# Patient Record
Sex: FEMALE | Race: WHITE | NOT HISPANIC OR LATINO | Employment: UNEMPLOYED | URBAN - METROPOLITAN AREA
[De-identification: names, ages, dates, MRNs, and addresses within clinical notes are randomized per-mention and may not be internally consistent; named-entity substitution may affect disease eponyms.]

---

## 2023-04-04 ENCOUNTER — OFFICE VISIT (OUTPATIENT)
Dept: PRIMARY CARE | Facility: CLINIC | Age: 38
End: 2023-04-04
Payer: COMMERCIAL

## 2023-04-04 VITALS
HEART RATE: 70 BPM | DIASTOLIC BLOOD PRESSURE: 70 MMHG | BODY MASS INDEX: 29.56 KG/M2 | WEIGHT: 206 LBS | SYSTOLIC BLOOD PRESSURE: 130 MMHG

## 2023-04-04 DIAGNOSIS — K21.9 GASTROESOPHAGEAL REFLUX DISEASE WITHOUT ESOPHAGITIS: ICD-10-CM

## 2023-04-04 DIAGNOSIS — F41.9 ANXIETY: Primary | ICD-10-CM

## 2023-04-04 DIAGNOSIS — E66.3 OVERWEIGHT (BMI 25.0-29.9): ICD-10-CM

## 2023-04-04 PROBLEM — J45.990 ASTHMA, EXERCISE INDUCED (HHS-HCC): Status: ACTIVE | Noted: 2023-04-04

## 2023-04-04 PROBLEM — M25.561 RIGHT KNEE PAIN: Status: ACTIVE | Noted: 2023-04-04

## 2023-04-04 PROBLEM — F51.04 CHRONIC INSOMNIA: Status: ACTIVE | Noted: 2023-04-04

## 2023-04-04 PROBLEM — G62.9 PERIPHERAL NEUROPATHY: Status: ACTIVE | Noted: 2022-03-29

## 2023-04-04 PROBLEM — M54.50 CHRONIC LOW BACK PAIN: Status: ACTIVE | Noted: 2023-04-04

## 2023-04-04 PROBLEM — E65 ABDOMINAL PANNUS: Status: ACTIVE | Noted: 2023-04-04

## 2023-04-04 PROBLEM — E04.9 GOITER: Status: ACTIVE | Noted: 2023-04-04

## 2023-04-04 PROBLEM — F39 UNSPECIFIED MOOD (AFFECTIVE) DISORDER (CMS-HCC): Chronic | Status: ACTIVE | Noted: 2022-05-11

## 2023-04-04 PROBLEM — F60.3 BORDERLINE PERSONALITY DISORDER (MULTI): Chronic | Status: ACTIVE | Noted: 2021-06-02

## 2023-04-04 PROBLEM — G89.29 CHRONIC LOW BACK PAIN: Status: ACTIVE | Noted: 2023-04-04

## 2023-04-04 PROBLEM — F43.10 POST-TRAUMATIC STRESS DISORDER, UNSPECIFIED: Status: ACTIVE | Noted: 2021-06-02

## 2023-04-04 PROBLEM — E55.9 VITAMIN D DEFICIENCY: Status: ACTIVE | Noted: 2023-04-04

## 2023-04-04 PROCEDURE — 99213 OFFICE O/P EST LOW 20 MIN: CPT | Performed by: STUDENT IN AN ORGANIZED HEALTH CARE EDUCATION/TRAINING PROGRAM

## 2023-04-04 PROCEDURE — 1036F TOBACCO NON-USER: CPT | Performed by: STUDENT IN AN ORGANIZED HEALTH CARE EDUCATION/TRAINING PROGRAM

## 2023-04-04 RX ORDER — MOMETASONE FUROATE AND FORMOTEROL FUMARATE DIHYDRATE 200; 5 UG/1; UG/1
AEROSOL RESPIRATORY (INHALATION)
COMMUNITY
Start: 2021-05-19

## 2023-04-04 RX ORDER — PROPRANOLOL HYDROCHLORIDE 10 MG/1
10 TABLET ORAL DAILY PRN
COMMUNITY
Start: 2023-04-03

## 2023-04-04 RX ORDER — QUETIAPINE FUMARATE 25 MG/1
25 TABLET, FILM COATED ORAL 2 TIMES DAILY
COMMUNITY
Start: 2022-04-29

## 2023-04-04 RX ORDER — ACETAMINOPHEN 500 MG
50 TABLET ORAL DAILY
COMMUNITY

## 2023-04-04 RX ORDER — IBUPROFEN 600 MG/1
1 TABLET ORAL EVERY 6 HOURS PRN
COMMUNITY
Start: 2019-04-09

## 2023-04-04 RX ORDER — HYDROXYZINE HYDROCHLORIDE 50 MG/1
50 TABLET, FILM COATED ORAL
COMMUNITY
Start: 2023-04-03 | End: 2024-01-26 | Stop reason: SDUPTHER

## 2023-04-04 RX ORDER — OMEPRAZOLE 40 MG/1
40 CAPSULE, DELAYED RELEASE ORAL
COMMUNITY
End: 2023-07-28 | Stop reason: SDUPTHER

## 2023-04-04 RX ORDER — TOPIRAMATE 50 MG/1
50 TABLET, FILM COATED ORAL 2 TIMES DAILY
COMMUNITY
Start: 2023-04-03 | End: 2024-01-04 | Stop reason: WASHOUT

## 2023-04-04 RX ORDER — BUSPIRONE HYDROCHLORIDE 30 MG/1
30 TABLET ORAL 2 TIMES DAILY
COMMUNITY
Start: 2023-04-03 | End: 2024-01-04 | Stop reason: WASHOUT

## 2023-04-04 RX ORDER — TRAZODONE HYDROCHLORIDE 50 MG/1
75 TABLET ORAL NIGHTLY
COMMUNITY
Start: 2023-04-03 | End: 2024-01-26 | Stop reason: SDUPTHER

## 2023-04-04 ASSESSMENT — PATIENT HEALTH QUESTIONNAIRE - PHQ9
1. LITTLE INTEREST OR PLEASURE IN DOING THINGS: NOT AT ALL
SUM OF ALL RESPONSES TO PHQ9 QUESTIONS 1 AND 2: 0
2. FEELING DOWN, DEPRESSED OR HOPELESS: NOT AT ALL

## 2023-04-04 NOTE — PROGRESS NOTES
Lucy Ann is a 38 y.o. year old female presenting for GERD       HPI:  Patient states that today is sort of a catch-up visit over the last couple of months.  She states that her anxiety has been rampant the last couple of months since turning 38.  Her psychiatric medications were changed since her previous visit and updated in her chart.  She states that her psychiatrist has left her, but they are setting her up with a new one.    She also states that she did see the dietitians and she was not very happy with the feedback she got from them stating that she was not getting very good answers to the questions that she asked of them.  She says that she has lost about 10 pounds on her own since seeing them, so they are really that helpful for her.  She continues to exercise regularly and they have cleaned up their diet at home pretty well.    Of note, she has a GI appointment next month.    ROS:   All pertinent positive symptoms are included in history of present illness.    All other systems have been reviewed and are negative and noncontributory to this patient's current ailments.    OBJECTIVE  Visit Vitals  /70   Pulse 70   Wt 93.4 kg (206 lb)   BMI 29.56 kg/m²   Smoking Status Never   BSA 2.15 m²        Physical Exam:  GENERAL: Alert, oriented, pleasant, in no acute distress  HEENT: Head normocephalic, atraumatic  EXTREMITIES: no edema, no cyanosis  PSYCH: Appropriate mood and affect  SKIN: No rashes or lesions appreciated    Assessment/Plan   Diagnoses and all orders for this visit:  Anxiety  Continue following up with psychiatry for medication adjustments and counseling  Gastroesophageal reflux disease without esophagitis  Please keep your GI appointment on May 16  Continue omeprazole 40 mg daily and you can add Pepcid as needed for any breakthrough symptoms  Overweight (BMI 25.0-29.9)  I explained that she should not focus so much on the number on the scale and instead on how her body has changed from  all of the exercise she has been doing. The muscle bulk can make the weight unchanged, but the body has actually changed.  Continue a well-balanced diet with regular physical activity    I asked that her next appointment is a physical exam

## 2023-06-12 LAB
ALANINE AMINOTRANSFERASE (SGPT) (U/L) IN SER/PLAS: 13 U/L (ref 7–45)
ALBUMIN (G/DL) IN SER/PLAS: 4.2 G/DL (ref 3.4–5)
ALKALINE PHOSPHATASE (U/L) IN SER/PLAS: 48 U/L (ref 33–110)
ANION GAP IN SER/PLAS: 9 MMOL/L (ref 10–20)
ASPARTATE AMINOTRANSFERASE (SGOT) (U/L) IN SER/PLAS: 18 U/L (ref 9–39)
BASOPHILS (10*3/UL) IN BLOOD BY AUTOMATED COUNT: 0.05 X10E9/L (ref 0–0.1)
BASOPHILS/100 LEUKOCYTES IN BLOOD BY AUTOMATED COUNT: 0.6 % (ref 0–2)
BILIRUBIN TOTAL (MG/DL) IN SER/PLAS: 0.4 MG/DL (ref 0–1.2)
CALCIUM (MG/DL) IN SER/PLAS: 9.2 MG/DL (ref 8.6–10.6)
CARBON DIOXIDE, TOTAL (MMOL/L) IN SER/PLAS: 26 MMOL/L (ref 21–32)
CHLORIDE (MMOL/L) IN SER/PLAS: 109 MMOL/L (ref 98–107)
CREATININE (MG/DL) IN SER/PLAS: 0.94 MG/DL (ref 0.5–1.05)
DEAMIDATED GLIADIN PEPTIDE IGA: <1 U/ML (ref 0–14)
DEAMIDATED GLIADIN PEPTIDE IGG: <1 U/ML (ref 0–14)
EOSINOPHILS (10*3/UL) IN BLOOD BY AUTOMATED COUNT: 0.24 X10E9/L (ref 0–0.7)
EOSINOPHILS/100 LEUKOCYTES IN BLOOD BY AUTOMATED COUNT: 2.9 % (ref 0–6)
ERYTHROCYTE DISTRIBUTION WIDTH (RATIO) BY AUTOMATED COUNT: 13.2 % (ref 11.5–14.5)
ERYTHROCYTE MEAN CORPUSCULAR HEMOGLOBIN CONCENTRATION (G/DL) BY AUTOMATED: 32.2 G/DL (ref 32–36)
ERYTHROCYTE MEAN CORPUSCULAR VOLUME (FL) BY AUTOMATED COUNT: 89 FL (ref 80–100)
ERYTHROCYTES (10*6/UL) IN BLOOD BY AUTOMATED COUNT: 4.58 X10E12/L (ref 4–5.2)
GFR FEMALE: 79 ML/MIN/1.73M2
GLUCOSE (MG/DL) IN SER/PLAS: 91 MG/DL (ref 74–99)
HEMATOCRIT (%) IN BLOOD BY AUTOMATED COUNT: 40.7 % (ref 36–46)
HEMOGLOBIN (G/DL) IN BLOOD: 13.1 G/DL (ref 12–16)
IMMATURE GRANULOCYTES/100 LEUKOCYTES IN BLOOD BY AUTOMATED COUNT: 0.4 % (ref 0–0.9)
LEUKOCYTES (10*3/UL) IN BLOOD BY AUTOMATED COUNT: 8.2 X10E9/L (ref 4.4–11.3)
LYMPHOCYTES (10*3/UL) IN BLOOD BY AUTOMATED COUNT: 2.32 X10E9/L (ref 1.2–4.8)
LYMPHOCYTES/100 LEUKOCYTES IN BLOOD BY AUTOMATED COUNT: 28.4 % (ref 13–44)
MONOCYTES (10*3/UL) IN BLOOD BY AUTOMATED COUNT: 0.53 X10E9/L (ref 0.1–1)
MONOCYTES/100 LEUKOCYTES IN BLOOD BY AUTOMATED COUNT: 6.5 % (ref 2–10)
NEUTROPHILS (10*3/UL) IN BLOOD BY AUTOMATED COUNT: 4.99 X10E9/L (ref 1.2–7.7)
NEUTROPHILS/100 LEUKOCYTES IN BLOOD BY AUTOMATED COUNT: 61.2 % (ref 40–80)
NRBC (PER 100 WBCS) BY AUTOMATED COUNT: 0 /100 WBC (ref 0–0)
PLATELETS (10*3/UL) IN BLOOD AUTOMATED COUNT: 206 X10E9/L (ref 150–450)
POTASSIUM (MMOL/L) IN SER/PLAS: 3.6 MMOL/L (ref 3.5–5.3)
PROTEIN TOTAL: 6.5 G/DL (ref 6.4–8.2)
SODIUM (MMOL/L) IN SER/PLAS: 140 MMOL/L (ref 136–145)
TISSUE TRANSGLUTAMINASE IGG: <1 U/ML (ref 0–14)
TISSUE TRANSGLUTAMINASE, IGA: <1 U/ML (ref 0–14)
UREA NITROGEN (MG/DL) IN SER/PLAS: 18 MG/DL (ref 6–23)

## 2023-06-16 ENCOUNTER — TELEPHONE (OUTPATIENT)
Dept: PRIMARY CARE | Facility: CLINIC | Age: 38
End: 2023-06-16
Payer: COMMERCIAL

## 2023-06-16 DIAGNOSIS — N83.8 ENLARGED OVARY: Primary | ICD-10-CM

## 2023-06-16 NOTE — TELEPHONE ENCOUNTER
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 She does have an enlarged spleen but I don't know why she would have that. Usually this wouldn't cause pain considering her liver looked normal.    Her right ovary is enlarged and it has some densities that need further evaluation with a pelvic ultrasound. Again, I do not know if this would be the cause of her pain. I can order that ultrasound for her.

## 2023-06-20 NOTE — RESULT ENCOUNTER NOTE
The right ovary has what appears to be a corpus luteum cyst, which is benign. No other abnormalities seen

## 2023-07-28 ENCOUNTER — TELEPHONE (OUTPATIENT)
Dept: PRIMARY CARE | Facility: CLINIC | Age: 38
End: 2023-07-28
Payer: COMMERCIAL

## 2023-07-28 DIAGNOSIS — K21.9 GASTROESOPHAGEAL REFLUX DISEASE, UNSPECIFIED WHETHER ESOPHAGITIS PRESENT: Primary | ICD-10-CM

## 2023-07-28 RX ORDER — OMEPRAZOLE 40 MG/1
40 CAPSULE, DELAYED RELEASE ORAL
Qty: 90 CAPSULE | Refills: 1 | Status: SHIPPED | OUTPATIENT
Start: 2023-07-28 | End: 2024-01-04 | Stop reason: SDUPTHER

## 2023-07-28 NOTE — TELEPHONE ENCOUNTER
Pt states that she has tried to go without her medication for over a month and she has been throwing up a lot.   Looking for refills sent .  I believe she said omeprazole?

## 2023-12-11 ENCOUNTER — TELEPHONE (OUTPATIENT)
Dept: PRIMARY CARE | Facility: CLINIC | Age: 38
End: 2023-12-11
Payer: COMMERCIAL

## 2023-12-11 DIAGNOSIS — E66.3 OVERWEIGHT (BMI 25.0-29.9): Primary | ICD-10-CM

## 2023-12-11 NOTE — TELEPHONE ENCOUNTER
Pt has upcoming physical exam and admits that mentally she is not in a very good place right now. She is concerned for her safety during her visit as recently one of her coping mechanisms has been a sense of dark humor. She admits she is also struggling with the decline of her daughters mental health as well. She recently had a set back with her mental health team and I believe they are working on a new psychiatrist for her.  She states that she is ready to start taking care of herself as she has put herself last for a while. She wanted us to be aware of this prior to her visit in hopes that we can show some compassion. I assured her that we are here to try to get her back on track in the right direction and that we also want her to be safe.

## 2023-12-11 NOTE — TELEPHONE ENCOUNTER
Pt was transferred up front to schedule her CPE. She is looking for a referral for a weight loss  as hers is no longer in the area.

## 2023-12-15 ENCOUNTER — TELEPHONE (OUTPATIENT)
Dept: PRIMARY CARE | Facility: CLINIC | Age: 38
End: 2023-12-15
Payer: COMMERCIAL

## 2023-12-15 DIAGNOSIS — F41.9 ANXIETY: ICD-10-CM

## 2023-12-15 DIAGNOSIS — F39 UNSPECIFIED MOOD (AFFECTIVE) DISORDER (CMS-HCC): Chronic | ICD-10-CM

## 2023-12-15 DIAGNOSIS — F43.10 POST-TRAUMATIC STRESS DISORDER, UNSPECIFIED: ICD-10-CM

## 2023-12-15 DIAGNOSIS — F60.3 BORDERLINE PERSONALITY DISORDER (MULTI): Primary | Chronic | ICD-10-CM

## 2023-12-15 NOTE — TELEPHONE ENCOUNTER
Pt looking for new psychiatrist.   She would like to have a referral placed and I did give her some information on [facility].

## 2024-01-02 NOTE — PROGRESS NOTES
Reason for Nutrition Visit:  Pt is a 38 y.o. female referred for   1. Overweight (BMI 25.0-29.9)         Pt was referred by Dr. Hyacinth Goldstein on 23    Past Medical Hx:  Patient Active Problem List   Diagnosis    Abdominal pannus    Chronic low back pain    Anxiety    Asthma, exercise induced    Borderline personality disorder (CMS/HCC)    Unspecified mood (affective) disorder (CMS/HCC)    Post-traumatic stress disorder, unspecified    GERD (gastroesophageal reflux disease)    Goiter    Overweight (BMI 25.0-29.9)    PCOS (polycystic ovarian syndrome)    Peripheral neuropathy    Right knee pain    Chronic insomnia    Vitamin D deficiency        Food and Nutrition Hx:    24 Diet Recall:  Meal 1:  Meal 2:  Meal 3:  Snacks:  Beverages:    Weight change:    Significant Weight Change: No  CW: (23) 215#  BMI: 31.8  Wt hx: (2023) 206#    Lab Results   Component Value Date    HGBA1C 4.2 10/13/2022    CHOL 174 10/13/2022    LDLF 109 (H) 10/13/2022    TRIG 98 10/13/2022    BUN 18 2023          {Food Preparation:18144}  {Cooking Skills/Barriers:96698}  {Grocery Shoppin}        {Allergies:14519}  {Intolerance:18673}  {Appetite:74873}  {Intake:86893}  {GI Symptoms (Optional):60509} {Frequency:62392}  {Swallowing Difficulty:70070}  {Dentition (Optional):99499}    {Eating Out Type:56346} {FREQUENCY:98580}  {Convenience Foods:11445} {FREQUENCY:15175}    {Types of Activities:57822}  {Duration:24390} {FREQUENCY:80969}    {Sleep duration/quality (Optional):38037}  {Sleep disorders:72813}    {Supplements:41392} {FREQUENCY:92308}      Nutrition Focused Physical Exam:    Performed/Deferred: Deferred as pt visually appears well-nourished with no signs of malnutrition          Malnutrition Present: No    Estimated Energy Needs:    {Weight Maintanence:10273}  {Weight Loss Needs:63876}  {Weight Gain Needs:60808}    {Estimated Needs:47056}      Nutrition Diagnosis:    Diagnosis Statement 1:  {Diagnosis  Status:38655}  {Diagnosis (Optional):22078} related to {Etiologies:75655} as evidenced by {Signs/Symptoms:69407}    Diagnosis Statement 2:  {Diagnosis Status:94430}  {Diagnosis (Optional):40646} related to {Etiologies:47359} as evidenced by {Signs/Symptoms:30320}      Nutrition Interventions:      Nutrition Goals:  {Nutrition Goals (Optional):62667}    Nutrition Recommendations:  1)     {Educational Handouts:90423}

## 2024-01-03 ENCOUNTER — APPOINTMENT (OUTPATIENT)
Dept: NUTRITION | Facility: CLINIC | Age: 39
End: 2024-01-03
Payer: COMMERCIAL

## 2024-01-04 ENCOUNTER — OFFICE VISIT (OUTPATIENT)
Dept: PRIMARY CARE | Facility: CLINIC | Age: 39
End: 2024-01-04
Payer: COMMERCIAL

## 2024-01-04 ENCOUNTER — PATIENT MESSAGE (OUTPATIENT)
Dept: PRIMARY CARE | Facility: CLINIC | Age: 39
End: 2024-01-04

## 2024-01-04 VITALS — BODY MASS INDEX: 30.92 KG/M2 | HEIGHT: 70 IN | HEART RATE: 78 BPM | WEIGHT: 216 LBS

## 2024-01-04 DIAGNOSIS — Z00.00 ENCOUNTER FOR PREVENTIVE HEALTH EXAMINATION: Primary | ICD-10-CM

## 2024-01-04 DIAGNOSIS — K21.9 GASTROESOPHAGEAL REFLUX DISEASE, UNSPECIFIED WHETHER ESOPHAGITIS PRESENT: ICD-10-CM

## 2024-01-04 DIAGNOSIS — L29.9 ITCHING: ICD-10-CM

## 2024-01-04 DIAGNOSIS — M25.551 RIGHT HIP PAIN: Primary | ICD-10-CM

## 2024-01-04 DIAGNOSIS — J45.990 ASTHMA, EXERCISE INDUCED (HHS-HCC): ICD-10-CM

## 2024-01-04 DIAGNOSIS — M79.671 RIGHT FOOT PAIN: ICD-10-CM

## 2024-01-04 DIAGNOSIS — Z11.59 ENCOUNTER FOR HEPATITIS C SCREENING TEST FOR LOW RISK PATIENT: ICD-10-CM

## 2024-01-04 DIAGNOSIS — Z12.31 VISIT FOR SCREENING MAMMOGRAM: ICD-10-CM

## 2024-01-04 DIAGNOSIS — F31.9 BIPOLAR I DISORDER, CURRENT EPISODE DEPRESSED (MULTI): ICD-10-CM

## 2024-01-04 PROCEDURE — 99214 OFFICE O/P EST MOD 30 MIN: CPT | Performed by: STUDENT IN AN ORGANIZED HEALTH CARE EDUCATION/TRAINING PROGRAM

## 2024-01-04 PROCEDURE — 99396 PREV VISIT EST AGE 40-64: CPT | Performed by: STUDENT IN AN ORGANIZED HEALTH CARE EDUCATION/TRAINING PROGRAM

## 2024-01-04 PROCEDURE — 1036F TOBACCO NON-USER: CPT | Performed by: STUDENT IN AN ORGANIZED HEALTH CARE EDUCATION/TRAINING PROGRAM

## 2024-01-04 RX ORDER — NYSTATIN 100000 [USP'U]/G
1 POWDER TOPICAL 2 TIMES DAILY
Qty: 60 G | Refills: 0 | Status: SHIPPED | OUTPATIENT
Start: 2024-01-04 | End: 2025-01-03

## 2024-01-04 RX ORDER — OMEPRAZOLE 40 MG/1
40 CAPSULE, DELAYED RELEASE ORAL
Qty: 90 CAPSULE | Refills: 3 | Status: SHIPPED | OUTPATIENT
Start: 2024-01-04 | End: 2025-01-03

## 2024-01-04 RX ORDER — FAMOTIDINE 20 MG/1
20 TABLET, FILM COATED ORAL NIGHTLY
COMMUNITY
End: 2024-01-04 | Stop reason: SDUPTHER

## 2024-01-04 RX ORDER — CLONAZEPAM 0.5 MG/1
0.5 TABLET ORAL DAILY PRN
COMMUNITY

## 2024-01-04 RX ORDER — FAMOTIDINE 20 MG/1
20 TABLET, FILM COATED ORAL NIGHTLY
Qty: 90 TABLET | Refills: 3 | Status: SHIPPED | OUTPATIENT
Start: 2024-01-04 | End: 2025-01-03

## 2024-01-04 RX ORDER — CLOTRIMAZOLE AND BETAMETHASONE DIPROPIONATE 10; .64 MG/G; MG/G
1 CREAM TOPICAL 2 TIMES DAILY
Qty: 45 G | Refills: 0 | Status: SHIPPED | OUTPATIENT
Start: 2024-01-04

## 2024-01-04 ASSESSMENT — PATIENT HEALTH QUESTIONNAIRE - PHQ9
SUM OF ALL RESPONSES TO PHQ9 QUESTIONS 1 AND 2: 0
2. FEELING DOWN, DEPRESSED OR HOPELESS: NOT AT ALL
1. LITTLE INTEREST OR PLEASURE IN DOING THINGS: NOT AT ALL

## 2024-01-04 NOTE — PROGRESS NOTES
Subjective   Patient ID: Lucy Ann is a 38 y.o. female who presents for Annual Exam.    HPI  Diet is well balanced.  Exercises regularly.  Mammogram due  PAP is not up to date.  Vaccines are up to date.  Dental exam is not up to date.  Vision exam is not up to date.  Patient is not fasting for labs today.   Social: denies tobacco use and alcohol use    Other concerns addressed today include:   Patient is requesting refills of her omeprazole and famotidine, which she takes daily to control chronic nausea and vomiting    She states that her mental health has been very poor over the last several months and she has been doing a lot of therapy and is off of most of her medications except for Seroquel and as needed Klonopin.    When bringing up her weight, she is very concerned about weight gain at this time stating that she does not understand why her weight is just increasing when she barely eats anything bad and she exercises regularly.  She is wondering if there are any medications she should try or if she should see the plastic surgeon about her abdomen.  Also she is pretty upset about a recent nutrition appointment that was essentially canceled because of a provider being ill.  She is very upset because her  took the whole day off because she had this appointment and it did not work out.  She did not reschedule that appointment, but she states she still needs a lot of help with her nutrition because someone needs to help her with her weight.    2 nights ago, she was pacing in her room and went to leave her room and kicked the door with her right foot and has had pain in her first 3 toes since.  She states that it is very painful in her shoe and to walk.  There is bruising on all the toes.  She is just requesting a x-ray at this time to make sure there is nothing broken in order to continue exercising.    She is complaining of a lot of itching right underneath her past  scar where her abdominal  fold is.  She has not noticed a rash in the area, has not changed any of her soaps, detergents or lotions.  She has gotten advice that maybe she should put cocoa butter there or try this other body wash.  Review of Systems  All pertinent positive symptoms are included in the history of present illness.    All other systems have been reviewed and are negative and noncontributory to this patient's current ailments.     Social History     Tobacco Use    Smoking status: Never    Smokeless tobacco: Never   Substance Use Topics    Alcohol use: Not on file      Past Surgical History:   Procedure Laterality Date    OTHER SURGICAL HISTORY  03/06/2019    Foot surgery    OTHER SURGICAL HISTORY  10/11/2022    Cholecystectomy    OTHER SURGICAL HISTORY  10/11/2022    Bunionectomy      Allergies   Allergen Reactions    Cephalexin Rash    Hydrocodone-Acetaminophen Itching    Lamotrigine Rash        Current Outpatient Medications   Medication Sig Dispense Refill    hydrOXYzine HCL (Atarax) 50 mg tablet Take 1 tablet (50 mg) by mouth.      ibuprofen 600 mg tablet Take 1 tablet (600 mg) by mouth every 6 hours if needed.      traZODone (Desyrel) 50 mg tablet Take 1.5 tablets (75 mg) by mouth once daily at bedtime.      cholecalciferol (Vitamin D-3) 50 mcg (2,000 unit) capsule Take 1 capsule (50 mcg) by mouth once daily.      clonazePAM (KlonoPIN) 0.5 mg tablet Take 1 tablet (0.5 mg) by mouth once daily as needed for anxiety.      clotrimazole-betamethasone (Lotrisone) cream Apply 1 Application topically 2 times a day. 45 g 0    Dulera 200-5 mcg/actuation inhaler Inhale.      famotidine (Pepcid) 20 mg tablet Take 1 tablet (20 mg) by mouth once daily at bedtime. 90 tablet 3    nystatin (Mycostatin) 100,000 unit/gram powder Apply 1 Application topically 2 times a day. 60 g 0    omeprazole (PriLOSEC) 40 mg DR capsule Take 1 capsule (40 mg) by mouth once daily in the morning. Take before meals. 90 capsule 3    propranolol (Inderal) 10 mg  "tablet Take 1 tablet (10 mg) by mouth once daily as needed.      QUEtiapine (SEROquel) 25 mg tablet Take 1 tablet (25 mg) by mouth 2 times a day.       No current facility-administered medications for this visit.        Patient Active Problem List   Diagnosis    Abdominal pannus    Chronic low back pain    Anxiety    Asthma, exercise induced    Borderline personality disorder (CMS/HCC)    Unspecified mood (affective) disorder (CMS/HCC)    Post-traumatic stress disorder, unspecified    GERD (gastroesophageal reflux disease)    Goiter    Overweight (BMI 25.0-29.9)    PCOS (polycystic ovarian syndrome)    Peripheral neuropathy    Right knee pain    Chronic insomnia    Vitamin D deficiency    Bipolar I disorder, current episode depressed (CMS/HCC)      Immunization History   Administered Date(s) Administered    Pfizer Purple Cap SARS-CoV-2 04/24/2021, 05/15/2021, 11/21/2021       Objective   VITALS  Pulse 78   Ht 1.778 m (5' 10\")   Wt 98 kg (216 lb)   BMI 30.99 kg/m²      Physical Exam  CONSTITUTIONAL - well nourished, well developed, looks like stated age, in no acute distress, not ill-appearing, and not tired appearing  SKIN - normal skin color and pigmentation, normal skin turgor without rash, lesions, or nodules visualized  HEAD - no trauma, normocephalic  EYES - pupils are equal and reactive to light, extraocular muscles are intact, and normal external exam  ENT - TM's intact, no injection, no signs of infection, uvula midline, normal tongue movement and throat normal, no exudate, nasal passage without discharge and patent  NECK - supple without rigidity, no neck mass was observed, no thyromegaly or thyroid nodules  CHEST - clear to auscultation, no wheezing, no crackles and no rales, good effort  CARDIAC - regular rate and regular rhythm, no skipped beats, no murmur  ABDOMEN - no organomegaly, soft, nontender, nondistended, normal bowel sounds, no guarding/rebound/rigidity, negative McBurney sign and negative " Schulte sign  EXTREMITIES - no edema, no deformities  NEUROLOGICAL - normal gait, normal balance, normal motor, no ataxia, DTRs equal and symmetrical; alert, oriented and no focal signs  PSYCHIATRIC - alert, pleasant and cordial, age-appropriate  IMMUNOLOGIC - no cervical lymphadenopathy     Assessment/Plan   Diagnoses and all orders for this visit:  Encounter for preventive health examination  A complete history and physical was performed today.  Vaccines are up to date.  Mammogram ordered.  PAP is not up to date.  Fasting labs ordered today include:  -     CBC; Future  -     Comprehensive Metabolic Panel; Future  -     Hemoglobin A1C; Future  -     Lipid Panel; Future  -     TSH with reflex to Free T4 if abnormal; Future  Encounter for hepatitis C screening test for low risk patient  -     Hepatitis C Antibody; Future  Visit for screening mammogram  -     BI mammo bilateral screening tomosynthesis; Future  I did recommend she look up a video or an explanation of a mammogram to see what a mammogram entails and how long the exam is since it is giving her a lot of anxiety thinking about getting 1.  She states that it is an exam for diagnosis in her mind and it freaks her out a little bit.  She is working on this with her therapist.  Gastroesophageal reflux disease, unspecified whether esophagitis present  Controlled on current medications, refills provided  -     famotidine (Pepcid) 20 mg tablet; Take 1 tablet (20 mg) by mouth once daily at bedtime.  -     omeprazole (PriLOSEC) 40 mg DR capsule; Take 1 capsule (40 mg) by mouth once daily in the morning. Take before meals.  Right foot pain  X-ray ordered to evaluate for any fractures of those first 3 toes.  Recommend ambulation as tolerated, can try postop shoe.  Tylenol for pain control  Itching  Prescribed Lotrisone and nystatin powder in case this is fungal right under the abdominal fold.  Also recommend an unscented body wash and a thick cream to the area in case  it is from dry skin  Bipolar I disorder, current episode depressed (CMS/[facility])  Her mental health has been struggling over the last few months, medications changed, doing a lot of therapy at this time, but appears stable in the office today  Asthma, exercise induced  Stable on current medications    Follow-up yearly for physical exams and sooner as needed

## 2024-01-05 ENCOUNTER — ANCILLARY PROCEDURE (OUTPATIENT)
Dept: RADIOLOGY | Facility: CLINIC | Age: 39
End: 2024-01-05
Payer: COMMERCIAL

## 2024-01-05 DIAGNOSIS — M79.671 RIGHT FOOT PAIN: ICD-10-CM

## 2024-01-05 DIAGNOSIS — M25.551 RIGHT HIP PAIN: ICD-10-CM

## 2024-01-05 PROCEDURE — 73630 X-RAY EXAM OF FOOT: CPT | Mod: RT

## 2024-01-05 PROCEDURE — 73502 X-RAY EXAM HIP UNI 2-3 VIEWS: CPT | Mod: RT

## 2024-01-05 PROCEDURE — 73502 X-RAY EXAM HIP UNI 2-3 VIEWS: CPT | Mod: RIGHT SIDE | Performed by: RADIOLOGY

## 2024-01-05 PROCEDURE — 73630 X-RAY EXAM OF FOOT: CPT | Mod: RIGHT SIDE | Performed by: RADIOLOGY

## 2024-01-08 ENCOUNTER — PATIENT MESSAGE (OUTPATIENT)
Dept: PRIMARY CARE | Facility: CLINIC | Age: 39
End: 2024-01-08
Payer: COMMERCIAL

## 2024-01-25 ENCOUNTER — APPOINTMENT (OUTPATIENT)
Dept: PRIMARY CARE | Facility: CLINIC | Age: 39
End: 2024-01-25
Payer: COMMERCIAL

## 2024-02-09 ENCOUNTER — TELEMEDICINE (OUTPATIENT)
Dept: PRIMARY CARE | Facility: CLINIC | Age: 39
End: 2024-02-09
Payer: COMMERCIAL

## 2024-02-09 DIAGNOSIS — R09.81 SINUS CONGESTION: ICD-10-CM

## 2024-02-09 DIAGNOSIS — R50.9 FEVER, UNSPECIFIED FEVER CAUSE: Primary | ICD-10-CM

## 2024-02-09 LAB — RSV RNA RESP QL NAA+PROBE: NOT DETECTED

## 2024-02-09 PROCEDURE — 1036F TOBACCO NON-USER: CPT | Performed by: STUDENT IN AN ORGANIZED HEALTH CARE EDUCATION/TRAINING PROGRAM

## 2024-02-09 PROCEDURE — 99213 OFFICE O/P EST LOW 20 MIN: CPT | Performed by: STUDENT IN AN ORGANIZED HEALTH CARE EDUCATION/TRAINING PROGRAM

## 2024-02-09 PROCEDURE — 87637 SARSCOV2&INF A&B&RSV AMP PRB: CPT

## 2024-02-09 NOTE — PROGRESS NOTES
Lucy Ann is a 38 y.o. year old female presenting for Fever (Along with chills, body aches)       HPI:  She is presenting via virtual encounter with complaint of ongoing fever, chills, body aches, sinus congestion that has not been letting up since 4 days ago.  She also has anorexia and feeling little nauseous when she does eat.  Has a lot of fatigue as well during this time.  She denies any chest pain or shortness of breath at this time and is not really coughing either.  Her kids actually have more GI symptoms and she does and they seem to be getting better whereas she has just been staying about the same.    ROS:   All pertinent positive symptoms are included in history of present illness.    All other systems have been reviewed and are negative and noncontributory to this patient's current ailments.    Current Outpatient Medications   Medication Sig Dispense Refill    cholecalciferol (Vitamin D-3) 50 mcg (2,000 unit) capsule Take 1 capsule (50 mcg) by mouth once daily.      clonazePAM (KlonoPIN) 0.5 mg tablet Take 1 tablet (0.5 mg) by mouth once daily as needed for anxiety.      clotrimazole-betamethasone (Lotrisone) cream Apply 1 Application topically 2 times a day. 45 g 0    Dulera 200-5 mcg/actuation inhaler Inhale.      famotidine (Pepcid) 20 mg tablet Take 1 tablet (20 mg) by mouth once daily at bedtime. 90 tablet 3    hydrOXYzine HCL (Atarax) 50 mg tablet Take 1 tablet (50 mg) by mouth every 4 hours if needed for anxiety. 90 tablet 0    ibuprofen 600 mg tablet Take 1 tablet (600 mg) by mouth every 6 hours if needed.      nystatin (Mycostatin) 100,000 unit/gram powder Apply 1 Application topically 2 times a day. 60 g 0    omeprazole (PriLOSEC) 40 mg DR capsule Take 1 capsule (40 mg) by mouth once daily in the morning. Take before meals. 90 capsule 3    propranolol (Inderal) 10 mg tablet Take 1 tablet (10 mg) by mouth once daily as needed.      QUEtiapine (SEROquel) 25 mg tablet Take 1 tablet (25 mg)  by mouth 2 times a day.      traZODone (Desyrel) 50 mg tablet Take 1.5 tablets (75 mg) by mouth once daily at bedtime. 135 tablet 0     No current facility-administered medications for this visit.       OBJECTIVE  Visit Vitals  Smoking Status Never        Physical Exam:  GENERAL: Alert, oriented, pleasant, in no acute distress  HEENT: Head normocephalic, atraumatic  PSYCH: Appropriate mood and affect  SKIN: No rashes or lesions appreciated    Assessment/Plan   Diagnoses and all orders for this visit:  Fever, unspecified fever cause  -     Sars-CoV-2 and Influenza A/B PCR; Future  -     RSV PCR; Future  Sinus congestion  -     Sars-CoV-2 and Influenza A/B PCR; Future  -     RSV PCR; Future  Will go ahead and test for COVID, flu and RSV.  Supportive measures at this time for her symptoms including Tylenol for fever and pains as well as plenty of fluids and rest.  She should call the office with any new or worsening symptoms.

## 2024-02-10 LAB
FLUAV RNA RESP QL NAA+PROBE: DETECTED
FLUBV RNA RESP QL NAA+PROBE: NOT DETECTED
SARS-COV-2 RNA RESP QL NAA+PROBE: NOT DETECTED

## 2024-02-22 ENCOUNTER — APPOINTMENT (OUTPATIENT)
Dept: PLASTIC SURGERY | Facility: CLINIC | Age: 39
End: 2024-02-22
Payer: COMMERCIAL

## 2024-03-12 ENCOUNTER — APPOINTMENT (OUTPATIENT)
Dept: GASTROENTEROLOGY | Facility: CLINIC | Age: 39
End: 2024-03-12
Payer: COMMERCIAL

## 2024-03-29 ENCOUNTER — OFFICE VISIT (OUTPATIENT)
Dept: PRIMARY CARE | Facility: CLINIC | Age: 39
End: 2024-03-29
Payer: COMMERCIAL

## 2024-03-29 VITALS
WEIGHT: 216 LBS | BODY MASS INDEX: 30.92 KG/M2 | DIASTOLIC BLOOD PRESSURE: 82 MMHG | HEIGHT: 70 IN | HEART RATE: 93 BPM | SYSTOLIC BLOOD PRESSURE: 122 MMHG

## 2024-03-29 DIAGNOSIS — R09.81 NASAL CONGESTION: ICD-10-CM

## 2024-03-29 DIAGNOSIS — J06.9 UPPER RESPIRATORY TRACT INFECTION, UNSPECIFIED TYPE: Primary | ICD-10-CM

## 2024-03-29 DIAGNOSIS — R23.4 SCAB: ICD-10-CM

## 2024-03-29 DIAGNOSIS — R53.83 OTHER FATIGUE: ICD-10-CM

## 2024-03-29 PROBLEM — L03.316 CELLULITIS OF UMBILICUS: Status: RESOLVED | Noted: 2024-03-29 | Resolved: 2024-03-29

## 2024-03-29 PROBLEM — B35.1 ONYCHOMYCOSIS OF TOENAIL: Status: ACTIVE | Noted: 2024-03-29

## 2024-03-29 PROBLEM — L03.316 CELLULITIS OF UMBILICUS: Status: ACTIVE | Noted: 2024-03-29

## 2024-03-29 PROBLEM — Z20.822 SUSPECTED COVID-19 VIRUS INFECTION: Status: ACTIVE | Noted: 2024-03-29

## 2024-03-29 PROBLEM — R06.00 DYSPNEA AND RESPIRATORY ABNORMALITIES: Status: ACTIVE | Noted: 2024-03-29

## 2024-03-29 PROBLEM — Z20.822 SUSPECTED COVID-19 VIRUS INFECTION: Status: RESOLVED | Noted: 2024-03-29 | Resolved: 2024-03-29

## 2024-03-29 PROBLEM — S99.921D RIGHT FOOT INJURY, SUBSEQUENT ENCOUNTER: Status: ACTIVE | Noted: 2024-03-29

## 2024-03-29 PROBLEM — R05.9 COUGH: Status: ACTIVE | Noted: 2024-03-29

## 2024-03-29 PROBLEM — J34.89 NASAL DISCHARGE: Status: ACTIVE | Noted: 2024-03-29

## 2024-03-29 PROBLEM — R63.5 UNEXPLAINED WEIGHT GAIN: Status: ACTIVE | Noted: 2024-03-29

## 2024-03-29 PROBLEM — R06.89 DYSPNEA AND RESPIRATORY ABNORMALITIES: Status: ACTIVE | Noted: 2024-03-29

## 2024-03-29 PROBLEM — R21 RASH: Status: RESOLVED | Noted: 2024-03-29 | Resolved: 2024-03-29

## 2024-03-29 PROBLEM — R06.00 DYSPNEA AND RESPIRATORY ABNORMALITIES: Status: RESOLVED | Noted: 2024-03-29 | Resolved: 2024-03-29

## 2024-03-29 PROBLEM — Z86.16 HISTORY OF SEVERE ACUTE RESPIRATORY SYNDROME CORONAVIRUS 2 (SARS-COV-2) DISEASE: Status: RESOLVED | Noted: 2024-03-29 | Resolved: 2024-03-29

## 2024-03-29 PROBLEM — S79.929A INJURY OF THIGH: Status: ACTIVE | Noted: 2024-03-29

## 2024-03-29 PROBLEM — U07.1 COVID-19 VIRUS INFECTION: Status: RESOLVED | Noted: 2024-03-29 | Resolved: 2024-03-29

## 2024-03-29 PROBLEM — M75.41 IMPINGEMENT SYNDROME OF RIGHT SHOULDER: Status: ACTIVE | Noted: 2024-03-29

## 2024-03-29 PROBLEM — H10.33 ACUTE BACTERIAL CONJUNCTIVITIS OF BOTH EYES: Status: ACTIVE | Noted: 2024-03-29

## 2024-03-29 PROBLEM — M22.8X1 MALTRACKING OF RIGHT PATELLA: Status: ACTIVE | Noted: 2024-03-29

## 2024-03-29 PROBLEM — R21 RASH: Status: ACTIVE | Noted: 2024-03-29

## 2024-03-29 PROBLEM — R63.2 POLYPHAGIA: Status: ACTIVE | Noted: 2024-03-29

## 2024-03-29 PROBLEM — R11.0 NAUSEA IN ADULT: Status: ACTIVE | Noted: 2024-03-29

## 2024-03-29 PROBLEM — E46 MALNUTRITION (MULTI): Status: ACTIVE | Noted: 2024-03-29

## 2024-03-29 PROBLEM — H10.33 ACUTE BACTERIAL CONJUNCTIVITIS OF BOTH EYES: Status: RESOLVED | Noted: 2024-03-29 | Resolved: 2024-03-29

## 2024-03-29 PROBLEM — L30.4 INTERTRIGO: Status: ACTIVE | Noted: 2024-03-29

## 2024-03-29 PROBLEM — M25.511 ACUTE PAIN OF RIGHT SHOULDER: Status: ACTIVE | Noted: 2024-03-29

## 2024-03-29 PROBLEM — S79.929A INJURY OF THIGH: Status: RESOLVED | Noted: 2024-03-29 | Resolved: 2024-03-29

## 2024-03-29 PROBLEM — R06.89 DYSPNEA AND RESPIRATORY ABNORMALITIES: Status: RESOLVED | Noted: 2024-03-29 | Resolved: 2024-03-29

## 2024-03-29 PROBLEM — U07.1 COVID-19 VIRUS INFECTION: Status: ACTIVE | Noted: 2024-03-29

## 2024-03-29 PROBLEM — R05.9 COUGH: Status: RESOLVED | Noted: 2024-03-29 | Resolved: 2024-03-29

## 2024-03-29 PROBLEM — L29.9 ITCHING: Status: ACTIVE | Noted: 2024-03-29

## 2024-03-29 PROBLEM — Z86.16 HISTORY OF SEVERE ACUTE RESPIRATORY SYNDROME CORONAVIRUS 2 (SARS-COV-2) DISEASE: Status: ACTIVE | Noted: 2024-03-29

## 2024-03-29 PROBLEM — R11.0 NAUSEA IN ADULT: Status: RESOLVED | Noted: 2024-03-29 | Resolved: 2024-03-29

## 2024-03-29 PROBLEM — J34.89 NASAL DISCHARGE: Status: RESOLVED | Noted: 2024-03-29 | Resolved: 2024-03-29

## 2024-03-29 PROBLEM — M79.675 GREAT TOE PAIN, LEFT: Status: ACTIVE | Noted: 2024-03-29

## 2024-03-29 PROBLEM — S76.312A HAMSTRING STRAIN, LEFT, INITIAL ENCOUNTER: Status: ACTIVE | Noted: 2024-03-29

## 2024-03-29 PROCEDURE — 99214 OFFICE O/P EST MOD 30 MIN: CPT | Performed by: FAMILY MEDICINE

## 2024-03-29 PROCEDURE — 1036F TOBACCO NON-USER: CPT | Performed by: FAMILY MEDICINE

## 2024-03-29 PROCEDURE — 87636 SARSCOV2 & INF A&B AMP PRB: CPT

## 2024-03-29 RX ORDER — DOXYCYCLINE 100 MG/1
100 CAPSULE ORAL 2 TIMES DAILY
Qty: 14 CAPSULE | Refills: 0 | Status: SHIPPED | OUTPATIENT
Start: 2024-03-29 | End: 2024-04-05

## 2024-03-29 ASSESSMENT — PATIENT HEALTH QUESTIONNAIRE - PHQ9
1. LITTLE INTEREST OR PLEASURE IN DOING THINGS: NOT AT ALL
2. FEELING DOWN, DEPRESSED OR HOPELESS: NOT AT ALL
SUM OF ALL RESPONSES TO PHQ9 QUESTIONS 1 AND 2: 0

## 2024-03-29 NOTE — ASSESSMENT & PLAN NOTE
COVID and Influenza PCR ordered, will follow up with results  Prescribed Doxycycline 100mg BID x 7 days in the situation that your symptoms do not improve in the next 4 to 5 days  Discussed that your symptoms are likely viral, as both of your children recovered without antibiotic therapy

## 2024-03-29 NOTE — ASSESSMENT & PLAN NOTE
You may use the topical mupirocin on the scab to facilitate healing  Please return to care if symptoms do not improve as we may have to get dermatology consult

## 2024-03-29 NOTE — PROGRESS NOTES
"Subjective   Patient ID: Lucy Ann is a 39 y.o. female who presents for Cough, Sore Throat, and Nasal Congestion.    HPI  1.  URI symptoms.  Symptoms started with a headache about 7 days ago  3 days ago, she developed dry cough, sinus congestion and sneezing, and fatigue  Both of patient's children were sick over the weekend and went to the ED to be evaluated  Children both had negative viral panels, and are now asymptomatic  She has been using Dayquil and nasal saline for symptoms  Denies any fevers  Has not tested for COVID at home    2.  Right arm scab.  Lucy presented with a pea-sized scab on her right upper extremity. Lucy expressed concern due to the red borders around the scab.    She denies any purulent discharge, swelling, or significant tenderness in the area. Lucy mentioned she has topical Mupirocin at home, which was previously prescribed by Dr. Claros for similar scabs.    Lucy requested an examination of the area in question.     Review of Systems  All pertinent positive symptoms are included in the history of present illness.    All other systems have been reviewed and are negative and noncontributory to this patient's current ailments.     Allergies   Allergen Reactions    Cephalexin Rash    Hydrocodone-Acetaminophen Itching    Lamotrigine Rash        Immunization History   Administered Date(s) Administered    Pfizer Purple Cap SARS-CoV-2 04/24/2021, 05/15/2021, 11/21/2021    Tdap vaccine, age 7 year and older (BOOSTRIX, ADACEL) 11/26/2016, 12/29/2017, 09/21/2018       Objective   Vitals:    03/29/24 0956   BP: 122/82   Pulse: 93   Weight: 98 kg (216 lb)   Height: 1.778 m (5' 10\")     Physical Exam  CONSTITUTIONAL - well nourished, well developed, looks like stated age, in no acute distress, not ill-appearing, and not tired appearing  SKIN - normal skin color and pigmentation, normal skin turgor. Small pea sized scab on right upper arm with thin erythematous borders without abscess, or " drainage  HEAD - no trauma, normocephalic  EYES - normal external exam  ENT - Uvula midline, normal tongue movement and throat normal, no exudate, nasal passage with clear nasal discharge  NECK - supple without rigidity, no neck mass was observed, no thyromegaly or thyroid nodules  CHEST - clear to auscultation, no wheezing, no crackles and no rales, good effort  CARDIAC - regular rate and regular rhythm, no skipped beats, no murmur  EXTREMITIES - no edema, no deformities  NEUROLOGICAL - normal gait, normal balance, normal motor, no ataxia, alert, oriented and no focal signs  PSYCHIATRIC - alert, pleasant and cordial, age-appropriate  IMMUNOLOGIC - no cervical lymphadenopathy     Assessment/Plan   Problem List Items Addressed This Visit       Fatigue    Relevant Medications    doxycycline (Vibramycin) 100 mg capsule    Other Relevant Orders    Sars-CoV-2 and Influenza A/B PCR    Nasal congestion    Relevant Medications    doxycycline (Vibramycin) 100 mg capsule    Other Relevant Orders    Sars-CoV-2 and Influenza A/B PCR    Scab     You may use the topical mupirocin on the scab to facilitate healing  Please return to care if symptoms do not improve as we may have to get dermatology consult         Relevant Medications    doxycycline (Vibramycin) 100 mg capsule    Upper respiratory tract infection - Primary     COVID and Influenza PCR ordered, will follow up with results  Prescribed Doxycycline 100mg BID x 7 days in the situation that your symptoms do not improve in the next 4 to 5 days  Discussed that your symptoms are likely viral, as both of your children recovered without antibiotic therapy         Relevant Medications    doxycycline (Vibramycin) 100 mg capsule

## 2024-03-30 LAB
FLUAV RNA RESP QL NAA+PROBE: NOT DETECTED
FLUBV RNA RESP QL NAA+PROBE: NOT DETECTED
SARS-COV-2 RNA RESP QL NAA+PROBE: NOT DETECTED

## 2024-03-31 NOTE — RESULT ENCOUNTER NOTE
Your testing for COVID and influenza is negative, so if you continue to have symptoms as described in the office the other day, I would recommend getting the antibiotic filled as prescribed and starting it

## 2024-04-02 DIAGNOSIS — R05.1 ACUTE COUGH: Primary | ICD-10-CM

## 2024-04-02 RX ORDER — BENZONATATE 200 MG/1
200 CAPSULE ORAL 3 TIMES DAILY PRN
Qty: 30 CAPSULE | Refills: 0 | Status: SHIPPED | OUTPATIENT
Start: 2024-04-02

## 2024-05-08 ENCOUNTER — TELEMEDICINE (OUTPATIENT)
Dept: PRIMARY CARE | Facility: CLINIC | Age: 39
End: 2024-05-08
Payer: COMMERCIAL

## 2024-05-08 DIAGNOSIS — L03.011 PARONYCHIA OF FINGER, RIGHT: Primary | ICD-10-CM

## 2024-05-08 PROCEDURE — 1036F TOBACCO NON-USER: CPT | Performed by: STUDENT IN AN ORGANIZED HEALTH CARE EDUCATION/TRAINING PROGRAM

## 2024-05-08 PROCEDURE — 99213 OFFICE O/P EST LOW 20 MIN: CPT | Performed by: STUDENT IN AN ORGANIZED HEALTH CARE EDUCATION/TRAINING PROGRAM

## 2024-05-08 RX ORDER — SULFAMETHOXAZOLE AND TRIMETHOPRIM 800; 160 MG/1; MG/1
1 TABLET ORAL 2 TIMES DAILY
Qty: 14 TABLET | Refills: 0 | Status: SHIPPED | OUTPATIENT
Start: 2024-05-08 | End: 2024-05-15

## 2024-05-08 NOTE — PROGRESS NOTES
Lucy Ann is a 39 y.o. year old female presenting for Hand Pain       HPI:  Patient noticed burning pain in her right index finger underneath the nail when dying her hair last night. This morning she noticed redness in that area and swelling of the finger. It is mildly painful. No redness up the finger or in the hand. No fever or chills.    ROS:   All pertinent positive symptoms are included in history of present illness.    All other systems have been reviewed and are negative and noncontributory to this patient's current ailments.    Current Outpatient Medications   Medication Sig Dispense Refill    benzonatate (Tessalon) 200 mg capsule Take 1 capsule (200 mg) by mouth 3 times a day as needed for cough. Do not crush or chew. 30 capsule 0    cholecalciferol (Vitamin D-3) 50 mcg (2,000 unit) capsule Take 1 capsule (50 mcg) by mouth once daily.      clonazePAM (KlonoPIN) 0.5 mg tablet Take 1 tablet (0.5 mg) by mouth once daily as needed for anxiety.      clotrimazole-betamethasone (Lotrisone) cream Apply 1 Application topically 2 times a day. 45 g 0    Dulera 200-5 mcg/actuation inhaler Inhale.      famotidine (Pepcid) 20 mg tablet Take 1 tablet (20 mg) by mouth once daily at bedtime. 90 tablet 3    hydrOXYzine HCL (Atarax) 50 mg tablet Take 1 tablet (50 mg) by mouth every 4 hours if needed for anxiety. 90 tablet 0    ibuprofen 600 mg tablet Take 1 tablet (600 mg) by mouth every 6 hours if needed.      nystatin (Mycostatin) 100,000 unit/gram powder Apply 1 Application topically 2 times a day. 60 g 0    omeprazole (PriLOSEC) 40 mg DR capsule Take 1 capsule (40 mg) by mouth once daily in the morning. Take before meals. 90 capsule 3    propranolol (Inderal) 10 mg tablet Take 1 tablet (10 mg) by mouth once daily as needed.      QUEtiapine (SEROquel) 25 mg tablet Take 1 tablet (25 mg) by mouth 2 times a day.      traZODone (Desyrel) 50 mg tablet Take 1.5 tablets (75 mg) by mouth once daily at bedtime. 135 tablet 0      No current facility-administered medications for this visit.       OBJECTIVE  Visit Vitals  Smoking Status Never        Virtual Physical Exam:  GENERAL: Alert, oriented, pleasant, in no acute distress  HEENT: Head normocephalic, atraumatic  PSYCH: Appropriate mood and affect  SKIN: No rashes or lesions appreciated    Assessment/Plan   Diagnoses and all orders for this visit:  Paronychia of finger, right  Likely infection causing the swelling. Start Bactrim, has Keflex allergy and she does pick and chew her nails. If she has worsening symptoms with the antibiotic, she should call the office  -     sulfamethoxazole-trimethoprim (Bactrim DS) 800-160 mg tablet; Take 1 tablet by mouth 2 times a day for 7 days.

## 2024-06-11 ENCOUNTER — PATIENT MESSAGE (OUTPATIENT)
Dept: PRIMARY CARE | Facility: CLINIC | Age: 39
End: 2024-06-11
Payer: COMMERCIAL

## 2024-06-11 DIAGNOSIS — R23.8 SKIN IRRITATION: Primary | ICD-10-CM

## 2024-06-11 DIAGNOSIS — R23.8 SKIN IRRITATION: ICD-10-CM

## 2024-06-11 RX ORDER — MUPIROCIN 20 MG/G
OINTMENT TOPICAL 3 TIMES DAILY
Qty: 22 G | Refills: 0 | Status: SHIPPED | OUTPATIENT
Start: 2024-06-11 | End: 2024-06-12 | Stop reason: SDUPTHER

## 2024-06-12 RX ORDER — MUPIROCIN 20 MG/G
OINTMENT TOPICAL 3 TIMES DAILY
Qty: 22 G | Refills: 0 | Status: SHIPPED | OUTPATIENT
Start: 2024-06-12 | End: 2024-06-22

## 2024-06-15 ENCOUNTER — LAB (OUTPATIENT)
Dept: LAB | Facility: LAB | Age: 39
End: 2024-06-15
Payer: COMMERCIAL

## 2024-06-15 DIAGNOSIS — Z00.00 ENCOUNTER FOR PREVENTIVE HEALTH EXAMINATION: ICD-10-CM

## 2024-06-15 DIAGNOSIS — Z11.59 ENCOUNTER FOR HEPATITIS C SCREENING TEST FOR LOW RISK PATIENT: ICD-10-CM

## 2024-06-15 PROCEDURE — 86803 HEPATITIS C AB TEST: CPT

## 2024-06-15 PROCEDURE — 36415 COLL VENOUS BLD VENIPUNCTURE: CPT

## 2024-06-15 PROCEDURE — 80061 LIPID PANEL: CPT

## 2024-06-15 PROCEDURE — 80053 COMPREHEN METABOLIC PANEL: CPT

## 2024-06-15 PROCEDURE — 85027 COMPLETE CBC AUTOMATED: CPT

## 2024-06-15 PROCEDURE — 84443 ASSAY THYROID STIM HORMONE: CPT

## 2024-06-15 PROCEDURE — 83036 HEMOGLOBIN GLYCOSYLATED A1C: CPT

## 2024-06-16 LAB
ALBUMIN SERPL BCP-MCNC: 4 G/DL (ref 3.4–5)
ALP SERPL-CCNC: 50 U/L (ref 33–110)
ALT SERPL W P-5'-P-CCNC: 12 U/L (ref 7–45)
ANION GAP SERPL CALC-SCNC: 11 MMOL/L (ref 10–20)
AST SERPL W P-5'-P-CCNC: 17 U/L (ref 9–39)
BILIRUB SERPL-MCNC: 0.4 MG/DL (ref 0–1.2)
BUN SERPL-MCNC: 16 MG/DL (ref 6–23)
CALCIUM SERPL-MCNC: 9.1 MG/DL (ref 8.6–10.6)
CHLORIDE SERPL-SCNC: 106 MMOL/L (ref 98–107)
CHOLEST SERPL-MCNC: 171 MG/DL (ref 0–199)
CHOLESTEROL/HDL RATIO: 4.1
CO2 SERPL-SCNC: 27 MMOL/L (ref 21–32)
CREAT SERPL-MCNC: 0.84 MG/DL (ref 0.5–1.05)
EGFRCR SERPLBLD CKD-EPI 2021: 90 ML/MIN/1.73M*2
ERYTHROCYTE [DISTWIDTH] IN BLOOD BY AUTOMATED COUNT: 13.7 % (ref 11.5–14.5)
EST. AVERAGE GLUCOSE BLD GHB EST-MCNC: 80 MG/DL
GLUCOSE SERPL-MCNC: 90 MG/DL (ref 74–99)
HBA1C MFR BLD: 4.4 %
HCT VFR BLD AUTO: 42.6 % (ref 36–46)
HCV AB SER QL: NONREACTIVE
HDLC SERPL-MCNC: 41.4 MG/DL
HGB BLD-MCNC: 13.7 G/DL (ref 12–16)
LDLC SERPL CALC-MCNC: 111 MG/DL
MCH RBC QN AUTO: 27.8 PG (ref 26–34)
MCHC RBC AUTO-ENTMCNC: 32.2 G/DL (ref 32–36)
MCV RBC AUTO: 87 FL (ref 80–100)
NON HDL CHOLESTEROL: 130 MG/DL (ref 0–149)
NRBC BLD-RTO: 0 /100 WBCS (ref 0–0)
PLATELET # BLD AUTO: 185 X10*3/UL (ref 150–450)
POTASSIUM SERPL-SCNC: 4.3 MMOL/L (ref 3.5–5.3)
PROT SERPL-MCNC: 6.4 G/DL (ref 6.4–8.2)
RBC # BLD AUTO: 4.92 X10*6/UL (ref 4–5.2)
SODIUM SERPL-SCNC: 140 MMOL/L (ref 136–145)
TRIGL SERPL-MCNC: 95 MG/DL (ref 0–149)
TSH SERPL-ACNC: 3.17 MIU/L (ref 0.44–3.98)
VLDL: 19 MG/DL (ref 0–40)
WBC # BLD AUTO: 7.7 X10*3/UL (ref 4.4–11.3)

## 2024-06-22 ENCOUNTER — TELEPHONE (OUTPATIENT)
Dept: PRIMARY CARE | Facility: CLINIC | Age: 39
End: 2024-06-22
Payer: COMMERCIAL

## 2024-06-22 NOTE — TELEPHONE ENCOUNTER
Lucy called the answering service after a bee sting that occurred about 10 min prior to the call. She was stung on her hand near her thumb. She has no history of allergic reaction to bee venom. She was in considerable amount of pain and this has never happened before. She applied some hydrocortisone cream and iced it.  I advised patient to continue icing the area to help reduce swelling and to take ibuprofen/acetaminophen prn for the pain. If the patient does not have relief of symptoms, I instructed her to present to the nearest urgent care to have the are examined as the stinger may still be in her hand. Pt denied any n/v or difficulty breathing. Pt is agreeable to managing the sting conservatively, and if it does not feel better later today, she will present to . All questions and concerns were addressed and pt was agreeable to this plan.     Maryjo Yang, DO

## 2024-07-03 DIAGNOSIS — K21.9 GASTROESOPHAGEAL REFLUX DISEASE, UNSPECIFIED WHETHER ESOPHAGITIS PRESENT: ICD-10-CM

## 2024-07-08 DIAGNOSIS — K21.9 GASTROESOPHAGEAL REFLUX DISEASE, UNSPECIFIED WHETHER ESOPHAGITIS PRESENT: Primary | ICD-10-CM

## 2024-07-08 RX ORDER — OMEPRAZOLE 40 MG/1
40 CAPSULE, DELAYED RELEASE ORAL
Qty: 90 CAPSULE | Refills: 3 | Status: SHIPPED | OUTPATIENT
Start: 2024-07-08 | End: 2025-07-08

## 2024-07-08 RX ORDER — OMEPRAZOLE 40 MG/1
CAPSULE, DELAYED RELEASE ORAL
Qty: 90 CAPSULE | Refills: 0 | OUTPATIENT
Start: 2024-07-08

## 2024-07-15 ENCOUNTER — APPOINTMENT (OUTPATIENT)
Dept: PRIMARY CARE | Facility: CLINIC | Age: 39
End: 2024-07-15
Payer: COMMERCIAL

## 2024-07-15 DIAGNOSIS — R60.0 BILATERAL LOWER EXTREMITY EDEMA: Primary | ICD-10-CM

## 2024-07-15 PROCEDURE — 99213 OFFICE O/P EST LOW 20 MIN: CPT | Performed by: STUDENT IN AN ORGANIZED HEALTH CARE EDUCATION/TRAINING PROGRAM

## 2024-07-15 PROCEDURE — 1036F TOBACCO NON-USER: CPT | Performed by: STUDENT IN AN ORGANIZED HEALTH CARE EDUCATION/TRAINING PROGRAM

## 2024-07-15 NOTE — PROGRESS NOTES
Lucy Ann is a 39 y.o. year old female presenting for Leg Swelling  This visit was completed via virtual platform with audio and visual    HPI:  She is complaining of swelling in both of her feet, the right more than the left over the last couple of weeks.  She thought it was just heat, but she is not so sure anymore.  She spends a lot of time at her computer william, about 4 to 5 hours a day.  She did try to change up her fluid intake and her coffee intake, but nothing really made a difference.  The swelling is worse at the end of the day versus the beginning of the day.  She has a little bit of pain in her right foot from, she is assuming, the swelling.  No redness or pain in the legs and the swelling is at her ankles.  She denies any chest pain or shortness of breath at this time.    ROS:   All pertinent positive symptoms are included in history of present illness.    All other systems have been reviewed and are negative and noncontributory to this patient's current ailments.    Current Outpatient Medications   Medication Sig Dispense Refill    benzonatate (Tessalon) 200 mg capsule Take 1 capsule (200 mg) by mouth 3 times a day as needed for cough. Do not crush or chew. 30 capsule 0    cholecalciferol (Vitamin D-3) 50 mcg (2,000 unit) capsule Take 1 capsule (50 mcg) by mouth once daily.      clonazePAM (KlonoPIN) 0.5 mg tablet Take 1 tablet (0.5 mg) by mouth once daily as needed for anxiety.      clotrimazole-betamethasone (Lotrisone) cream Apply 1 Application topically 2 times a day. 45 g 0    Dulera 200-5 mcg/actuation inhaler Inhale.      famotidine (Pepcid) 20 mg tablet Take 1 tablet (20 mg) by mouth once daily at bedtime. 90 tablet 3    hydrOXYzine HCL (Atarax) 50 mg tablet Take 1 tablet (50 mg) by mouth every 4 hours if needed for anxiety. 90 tablet 0    ibuprofen 600 mg tablet Take 1 tablet (600 mg) by mouth every 6 hours if needed.      nystatin (Mycostatin) 100,000 unit/gram powder Apply 1  Application topically 2 times a day. 60 g 0    omeprazole (PriLOSEC) 40 mg DR capsule Take 1 capsule (40 mg) by mouth once daily in the morning. Take before meals. 90 capsule 3    propranolol (Inderal) 10 mg tablet Take 1 tablet (10 mg) by mouth once daily as needed.      QUEtiapine (SEROquel) 25 mg tablet Take 1 tablet (25 mg) by mouth 2 times a day.      traZODone (Desyrel) 50 mg tablet Take 1.5 tablets (75 mg) by mouth once daily at bedtime. 135 tablet 0     No current facility-administered medications for this visit.       OBJECTIVE  Visit Vitals  Smoking Status Never        Virtual physical Exam:  GENERAL: Alert, oriented, pleasant, in no acute distress  HEENT: Head normocephalic, atraumatic  PSYCH: Appropriate mood and affect  SKIN: No rashes or lesions appreciated    Assessment/Plan   Diagnoses and all orders for this visit:  Bilateral lower extremity edema  Most likely swelling secondary to venous insufficiency from sitting for long periods of time at once.  No red flag symptoms at this time that would make me think she has a DVT currently.  Will check a BNP to rule out heart failure as a cause.  I highly recommend she get the vascular ultrasound that I ordered for her lower extremities at her earliest convenience, but she states that there is a lot healthwise going on with one of her children and they are taking up a lot of her time throughout the week.  She states she will do her best to get that done soon.  She is to call the office with any new or worsening symptoms.  -     B-type natriuretic peptide; Future  -     Vascular US lower extremity venous insufficiency bilateral; Future

## 2024-08-17 ENCOUNTER — APPOINTMENT (OUTPATIENT)
Dept: CARDIOLOGY | Facility: HOSPITAL | Age: 39
End: 2024-08-17
Payer: COMMERCIAL

## 2024-08-24 ENCOUNTER — APPOINTMENT (OUTPATIENT)
Dept: RADIOLOGY | Facility: CLINIC | Age: 39
End: 2024-08-24
Payer: COMMERCIAL

## 2024-09-06 ENCOUNTER — OFFICE VISIT (OUTPATIENT)
Dept: PRIMARY CARE | Facility: CLINIC | Age: 39
End: 2024-09-06
Payer: COMMERCIAL

## 2024-09-06 ENCOUNTER — PATIENT MESSAGE (OUTPATIENT)
Dept: PRIMARY CARE | Facility: CLINIC | Age: 39
End: 2024-09-06

## 2024-09-06 VITALS
HEART RATE: 69 BPM | WEIGHT: 225 LBS | BODY MASS INDEX: 31.5 KG/M2 | DIASTOLIC BLOOD PRESSURE: 70 MMHG | SYSTOLIC BLOOD PRESSURE: 110 MMHG | HEIGHT: 71 IN

## 2024-09-06 DIAGNOSIS — S46.812D STRAIN OF LEFT SUBSCAPULARIS MUSCLE, SUBSEQUENT ENCOUNTER: Primary | ICD-10-CM

## 2024-09-06 DIAGNOSIS — M25.511 ACUTE PAIN OF BOTH SHOULDERS: Primary | ICD-10-CM

## 2024-09-06 DIAGNOSIS — M25.512 ACUTE PAIN OF BOTH SHOULDERS: Primary | ICD-10-CM

## 2024-09-06 PROBLEM — S46.812A STRAIN OF LEFT SUBSCAPULARIS MUSCLE: Status: ACTIVE | Noted: 2024-09-06

## 2024-09-06 PROBLEM — S46.819A SUBSCAPULARIS (MUSCLE) SPRAIN: Status: ACTIVE | Noted: 2024-09-06

## 2024-09-06 PROCEDURE — 3008F BODY MASS INDEX DOCD: CPT | Performed by: STUDENT IN AN ORGANIZED HEALTH CARE EDUCATION/TRAINING PROGRAM

## 2024-09-06 PROCEDURE — 1036F TOBACCO NON-USER: CPT | Performed by: STUDENT IN AN ORGANIZED HEALTH CARE EDUCATION/TRAINING PROGRAM

## 2024-09-06 PROCEDURE — 99213 OFFICE O/P EST LOW 20 MIN: CPT | Performed by: STUDENT IN AN ORGANIZED HEALTH CARE EDUCATION/TRAINING PROGRAM

## 2024-09-06 NOTE — ASSESSMENT & PLAN NOTE
(+) [place]'s Test is indicative of injury to subscapularis test  Will control inflammation with OTC antiinflammatory use  If pain persists past 2 weeks, further imaging and workup needed

## 2024-09-06 NOTE — PROGRESS NOTES
Subjective   Patient ID: Lucy Ann is a 39 y.o. female who presents for Shoulder Pain.    Past Medical, Surgical, and Family History reviewed and updated in chart.    Reviewed all medications by prescribing practitioner or clinical pharmacist (such as prescriptions, OTCs, herbal therapies and supplements) and documented in the medical record.    HPI  Shoulder Injury  - Patient is here for an ED follow up after injuring her shoulder while performing a moderate intensity workout with weights.   - She had 10/10 pain and went to ED. Negative X-Ray. Persistent 7/10 pain. Limited ROM.  - Mental health has been negatively impacted by injury as working out has provided her with mental wellness    Review of Systems  All pertinent positive symptoms are included in the history of present illness.    All other systems have been reviewed and are negative and noncontributory to this patient's current ailments.    History reviewed. No pertinent past medical history.  Past Surgical History:   Procedure Laterality Date    OTHER SURGICAL HISTORY  03/06/2019    Foot surgery    OTHER SURGICAL HISTORY  10/11/2022    Cholecystectomy    OTHER SURGICAL HISTORY  10/11/2022    Bunionectomy     Social History     Tobacco Use    Smoking status: Never    Smokeless tobacco: Never     No family history on file.  Immunization History   Administered Date(s) Administered    Pfizer Purple Cap SARS-CoV-2 04/24/2021, 05/15/2021, 11/21/2021    Tdap vaccine, age 7 year and older (BOOSTRIX, ADACEL) 11/26/2016, 12/29/2017, 09/21/2018     Current Outpatient Medications   Medication Instructions    benzonatate (TESSALON) 200 mg, oral, 3 times daily PRN, Do not crush or chew.    cholecalciferol (VITAMIN D-3) 50 mcg, oral, Daily    clonazePAM (KLONOPIN) 0.5 mg, oral, Daily PRN    clotrimazole-betamethasone (Lotrisone) cream 1 Application, Topical, 2 times daily    Dulera 200-5 mcg/actuation inhaler inhalation    famotidine (PEPCID) 20 mg, oral, Nightly  "   hydrOXYzine HCL (ATARAX) 50 mg, oral, Every 4 hours PRN    ibuprofen 600 mg tablet 1 tablet, oral, Every 6 hours PRN    nystatin (Mycostatin) 100,000 unit/gram powder 1 Application, Topical, 2 times daily    omeprazole (PRILOSEC) 40 mg, oral, Daily before breakfast    propranolol (INDERAL) 10 mg, oral, Daily PRN    QUEtiapine (SEROQUEL) 25 mg, oral, 2 times daily    traZODone (DESYREL) 75 mg, oral, Nightly     Allergies   Allergen Reactions    Cephalexin Rash    Hydrocodone-Acetaminophen Itching    Lamotrigine Rash       Objective   Vitals:    09/06/24 1132   BP: 110/70   Pulse: 69   Weight: 102 kg (225 lb)   Height: 1.803 m (5' 11\")     Body mass index is 31.38 kg/m².    BP Readings from Last 3 Encounters:   09/06/24 110/70   03/29/24 122/82   04/04/23 130/70      Wt Readings from Last 3 Encounters:   09/06/24 102 kg (225 lb)   03/29/24 98 kg (216 lb)   01/04/24 98 kg (216 lb)        No visits with results within 1 Month(s) from this visit.   Latest known visit with results is:   Lab on 06/15/2024   Component Date Value    WBC 06/15/2024 7.7     nRBC 06/15/2024 0.0     RBC 06/15/2024 4.92     Hemoglobin 06/15/2024 13.7     Hematocrit 06/15/2024 42.6     MCV 06/15/2024 87     MCH 06/15/2024 27.8     MCHC 06/15/2024 32.2     RDW 06/15/2024 13.7     Platelets 06/15/2024 185     Glucose 06/15/2024 90     Sodium 06/15/2024 140     Potassium 06/15/2024 4.3     Chloride 06/15/2024 106     Bicarbonate 06/15/2024 27     Anion Gap 06/15/2024 11     Urea Nitrogen 06/15/2024 16     Creatinine 06/15/2024 0.84     eGFR 06/15/2024 90     Calcium 06/15/2024 9.1     Albumin 06/15/2024 4.0     Alkaline Phosphatase 06/15/2024 50     Total Protein 06/15/2024 6.4     AST 06/15/2024 17     Bilirubin, Total 06/15/2024 0.4     ALT 06/15/2024 12     Hemoglobin A1C 06/15/2024 4.4     Estimated Average Glucose 06/15/2024 80     Cholesterol 06/15/2024 171     HDL-Cholesterol 06/15/2024 41.4     Cholesterol/HDL Ratio 06/15/2024 4.1     LDL " Calculated 06/15/2024 111 (H)     VLDL 06/15/2024 19     Triglycerides 06/15/2024 95     Non HDL Cholesterol 06/15/2024 130     Thyroid Stimulating Horm* 06/15/2024 3.17     Hepatitis C AB 06/15/2024 Nonreactive      Physical Exam  CONSTITUTIONAL - well nourished, well developed, looks like stated age, in no acute distress, not ill-appearing, and not tired appearing  SKIN - normal skin color and pigmentation, normal skin turgor without rash, lesions, or nodules visualized  HEAD - no trauma, normocephalic  NECK - supple without rigidity, no neck mass was observed, no thyromegaly or thyroid nodules  CHEST - clear to auscultation, no wheezing, no crackles and no rales, good effort  CARDIAC - regular rate and regular rhythm, no skipped beats, no murmur  ABDOMEN - no organomegaly, non distended  EXTREMITIES - Tenderness in Left Shoudlder, Pain with movement, + [place] Test (subscapularis)  NEUROLOGICAL - normal gait, normal balance, normal motor, no ataxia, DTRs equal and symmetrical; alert, oriented and no focal signs  PSYCHIATRIC - alert, pleasant and cordial, age-appropriate  IMMUNOLOGIC - no cervical lymphadenopathy    Assessment/Plan   Problem List Items Addressed This Visit       Strain of left subscapularis muscle - Primary     (+) [place]'s Test is indicative of injury to subscapularis test  Will control inflammation with OTC antiinflammatory use  If pain persists past 2 weeks, further imaging and workup needed

## 2024-09-07 ENCOUNTER — TELEPHONE (OUTPATIENT)
Dept: PRIMARY CARE | Facility: CLINIC | Age: 39
End: 2024-09-07
Payer: COMMERCIAL

## 2024-09-07 NOTE — TELEPHONE ENCOUNTER
Lucy is a 38 yo F who called the answering service with complaint of fever. She reported that her temperature was 100 °F when checked the previous evening. In addition to the fever, she is experiencing a sore throat, fatigue, and a headache. Her two children and  also with similar symptoms.    Lucy had an appointment with Dr. Claros yesterday for a subscapularis strain and was advised to take naproxen and acetaminophen as needed for pain. She took two doses of daytime cold and flu medication, with her last dose at 10 AM. She denies any increased work of breathing, tachypnea, wheezing, or shortness of breath.    After discussing her symptoms, it appears that Lucy is likely dealing with a viral upper respiratory infection. She was advised to manage her symptoms with increased fluid intake, NSAIDs, and acetaminophen as needed for fever and pain, along with rest. We reviewed ED precautions and instructed her to follow up with Dr. Claros in the office if her condition does not improve within the next week. Patient agreeable to the plan.     Maryjo Yang, DO

## 2024-09-09 ENCOUNTER — TELEMEDICINE (OUTPATIENT)
Dept: PRIMARY CARE | Facility: CLINIC | Age: 39
End: 2024-09-09
Payer: COMMERCIAL

## 2024-09-09 DIAGNOSIS — J02.9 ACUTE SORE THROAT: Primary | ICD-10-CM

## 2024-09-09 PROCEDURE — 1036F TOBACCO NON-USER: CPT | Performed by: STUDENT IN AN ORGANIZED HEALTH CARE EDUCATION/TRAINING PROGRAM

## 2024-09-09 PROCEDURE — 99213 OFFICE O/P EST LOW 20 MIN: CPT | Performed by: STUDENT IN AN ORGANIZED HEALTH CARE EDUCATION/TRAINING PROGRAM

## 2024-09-09 RX ORDER — AMOXICILLIN 500 MG/1
500 CAPSULE ORAL EVERY 12 HOURS SCHEDULED
Qty: 20 CAPSULE | Refills: 0 | Status: SHIPPED | OUTPATIENT
Start: 2024-09-09 | End: 2024-09-19

## 2024-09-09 NOTE — PROGRESS NOTES
Virtual visit  Patient called due to sore throat  which started 3 days ago. She had fever which was 101.4 F  And slight headache, and ear pain. Patient reported had one time vomit but was able to tolerated food and fluid. She has tried naproxen and tylenol.   She denied cough, running nose,  chest pain, sob, tearing from the eyes.   Patient had sick contact( her boyfriend and her daughter) who were tested positive for strep throat.     Physical exam  General-alert and oriented, able to provide the history  Lungs- speak full sentences  Skin- no rash  Psych- appropriate mood and affect    Assessment and plan  She is 39 year old female who called due to having sore throat for the past 3 days. Patient had family member tested positive for strep throat.     #acute Tonsillitis   -Patient was in close contact with family members who were tested positive for strep throat.   -was advised to drink fluid  -order amoxicillin 500 mg BID for 10 days.   -let us know if your symptoms do not get better in the end of the week.    No red flag.   I discussed my plan with my attending Dr. Triston Arambula. MD  Family medicine resident  PGY3

## 2024-09-16 ENCOUNTER — PATIENT MESSAGE (OUTPATIENT)
Dept: PRIMARY CARE | Facility: CLINIC | Age: 39
End: 2024-09-16
Payer: COMMERCIAL

## 2024-09-16 DIAGNOSIS — K64.9 BLEEDING HEMORRHOIDS: Primary | ICD-10-CM

## 2024-09-16 RX ORDER — HYDROCORTISONE 25 MG/G
CREAM TOPICAL 4 TIMES DAILY PRN
Qty: 30 G | Refills: 0 | Status: SHIPPED | OUTPATIENT
Start: 2024-09-16 | End: 2025-09-16

## 2024-09-16 RX ORDER — HYDROCORTISONE ACETATE 25 MG/1
25 SUPPOSITORY RECTAL 2 TIMES DAILY PRN
Qty: 12 SUPPOSITORY | Refills: 0 | Status: SHIPPED | OUTPATIENT
Start: 2024-09-16

## 2024-09-17 ENCOUNTER — APPOINTMENT (OUTPATIENT)
Dept: OBSTETRICS AND GYNECOLOGY | Facility: CLINIC | Age: 39
End: 2024-09-17
Payer: COMMERCIAL

## 2024-09-17 VITALS
SYSTOLIC BLOOD PRESSURE: 102 MMHG | DIASTOLIC BLOOD PRESSURE: 78 MMHG | WEIGHT: 215 LBS | HEIGHT: 71 IN | BODY MASS INDEX: 30.1 KG/M2

## 2024-09-17 DIAGNOSIS — N76.0 ACUTE VAGINITIS: Primary | ICD-10-CM

## 2024-09-17 PROCEDURE — 1036F TOBACCO NON-USER: CPT | Performed by: NURSE PRACTITIONER

## 2024-09-17 PROCEDURE — 3008F BODY MASS INDEX DOCD: CPT | Performed by: NURSE PRACTITIONER

## 2024-09-17 PROCEDURE — 99214 OFFICE O/P EST MOD 30 MIN: CPT | Performed by: NURSE PRACTITIONER

## 2024-09-17 RX ORDER — FLUCONAZOLE 150 MG/1
150 TABLET ORAL ONCE
Qty: 2 TABLET | Refills: 0 | Status: SHIPPED | OUTPATIENT
Start: 2024-09-17 | End: 2024-09-17

## 2024-09-17 RX ORDER — CLOTRIMAZOLE AND BETAMETHASONE DIPROPIONATE 10; .64 MG/G; MG/G
1 CREAM TOPICAL 2 TIMES DAILY
Qty: 15 G | Refills: 1 | Status: SHIPPED | OUTPATIENT
Start: 2024-09-17 | End: 2024-10-15

## 2024-09-17 NOTE — PROGRESS NOTES
Subjective   Lucy Ann is a 39 y.o. female who complains of vaginal discharge/itch.    HPI:  Here with c/o vaginal itching and white discharge that started over the weekend. Discharge is white, somewhat thick. Noticed this after taking a bath. She was concerned it may be due to having hard water at her house. She is also having external redness and some itching. Feels the external symptoms are worse after working out. She is currently being treated with amoxicillin for a URI/ sore throat. Finishing her course of antibiotics today.     She has a hx of some GYN exam related trauma and has been working with a therapist to work though some of her fears.   She declines an exam today, would like to talk about a plan of care moving forward.     Symptoms reported: vaginal discharge, vaginal itching, and burning  Onset: a few days ago  Course: persistent  Progression: stayed the same    Helpful treatments: none  Unhelpful treatments tried: none    Objective   Physical Exam:   General Appearance: alert and oriented, in no acute distress and cooperative, anxious.   Abdomen: soft, non-tender; bowel sounds normal; no masses, no organomegaly and pt deferred exam  Pelvic Exam: deferred  Urine dipstick: not done.    Assessment/Plan   Diagnoses and all orders for this visit:  Acute vaginitis  -     clotrimazole-betamethasone (Lotrisone) cream; Apply 1 Application topically 2 times a day for 28 days.  -     fluconazole (Diflucan) 150 mg tablet; Take 1 tablet (150 mg) by mouth 1 time for 1 dose. May repeat second tablet in 3 days if symptoms persist.  Discussed with patient that most likely her symptoms of thick white discharge are related to her recent antibiotic use. Pt declines exam today. She was offered to self-collect a gram stain. She declines. She would like to try medication first, and if no improvement, she is agreeable to return in one week for self-collection of gram stain. Additionally, we discussed PAP guidelines,  her last PAP was probably about 6-7 years ago. States she is not yet ready to do a PAP, but will remain open to it. Also discussed possibility of self-collection of PAP, though reviewed may not be as accurate as collection with pelvic exam.  She will consider. She will make a follow-up appointment for one week.

## 2024-09-23 ENCOUNTER — APPOINTMENT (OUTPATIENT)
Dept: OBSTETRICS AND GYNECOLOGY | Facility: CLINIC | Age: 39
End: 2024-09-23
Payer: COMMERCIAL

## 2024-10-04 ENCOUNTER — TELEMEDICINE (OUTPATIENT)
Dept: PRIMARY CARE | Facility: CLINIC | Age: 39
End: 2024-10-04
Payer: COMMERCIAL

## 2024-10-04 DIAGNOSIS — J98.8 RESPIRATORY INFECTION: Primary | ICD-10-CM

## 2024-10-04 PROCEDURE — 99213 OFFICE O/P EST LOW 20 MIN: CPT | Performed by: STUDENT IN AN ORGANIZED HEALTH CARE EDUCATION/TRAINING PROGRAM

## 2024-10-04 PROCEDURE — 1036F TOBACCO NON-USER: CPT | Performed by: STUDENT IN AN ORGANIZED HEALTH CARE EDUCATION/TRAINING PROGRAM

## 2024-10-04 RX ORDER — DOXYCYCLINE 100 MG/1
100 CAPSULE ORAL 2 TIMES DAILY
Qty: 14 CAPSULE | Refills: 0 | Status: SHIPPED | OUTPATIENT
Start: 2024-10-04 | End: 2024-10-11

## 2024-10-04 RX ORDER — AZITHROMYCIN 250 MG/1
TABLET, FILM COATED ORAL
Qty: 6 TABLET | Refills: 0 | Status: CANCELLED | OUTPATIENT
Start: 2024-10-04 | End: 2024-10-09

## 2024-10-04 RX ORDER — METHYLPREDNISOLONE 4 MG/1
TABLET ORAL
Qty: 21 TABLET | Refills: 0 | Status: SHIPPED | OUTPATIENT
Start: 2024-10-04 | End: 2024-10-11

## 2024-10-04 NOTE — PROGRESS NOTES
Subjective   Patient ID: Lucy Ann is a 39 y.o. female who presents for Cough.    HPI   She is 39-year-old female who is complaining about having a dry cough for 3 to 4 days.  Patient is complaining about mild shortness of breath and slightly headache with coughing.  Both of her daughters are sick at home with fever and cough.   Patient have tried to drink tea.  She did not get better with benzonatate and using humidifier at home.   She denied wheezing, runny nose, fever, sore throat, chest pain, nausea, vomiting, abdominal pain, rash.     Review of Systems  Are reviewed and are negative    Objective   LMP 08/26/2024 (Approximate)     Physical Exam  General: Alert and oriented. Appears well-nourished and in no acute distress.  Eyes: PERRLA. EOMI.  Head/neck: Normocephalic. Supple.  Lymphatics: No cervical lymphadenopathy.  Respiratory/Thorax: Clear to auscultation bilaterally. No wheezing.   Cardiovascular: Regular rate and rhythm. No murmurs.  Gastrointestinal: Soft, nontender, nondistended. +BS   Musculoskeletal: ROM intact. No joint swelling. Normal strength   Extremities: Warm and well perfused. No peripheral edema.  Neurological: No gross neurologic deficits.   Psychological: Appropriate mood and affect.   Skin: No visible rashes or lesions.    Assessment/Plan   She is 39 years old female who called today to the office to not feeling good with the cough and mild shortness of breath for the past 3 days.       Respiratory infection  -     methylPREDNISolone (Medrol Dospak) 4 mg tablets; Take as directed on package.  -     doxycycline (Vibramycin) 100 mg capsule; Take 1 capsule (100 mg) by mouth 2 times a day for 7 days. Take with at least 8 ounces (large glass) of water, do not lie down for 30 minutes taking the tablet  -Please let us know if the symptoms get worse or go to the closest emergency department.   -Please take plenty of fluid.    I discussed my plan with my attending, Dr Triston Javier  Ralf. MD  Family medicine resident  PGY 3

## 2024-10-07 DIAGNOSIS — J45.990 ASTHMA, EXERCISE INDUCED (HHS-HCC): Primary | ICD-10-CM

## 2024-10-07 RX ORDER — ALBUTEROL SULFATE 90 UG/1
2 INHALANT RESPIRATORY (INHALATION) EVERY 6 HOURS PRN
COMMUNITY
End: 2024-10-07 | Stop reason: SDUPTHER

## 2024-10-07 RX ORDER — ALBUTEROL SULFATE 90 UG/1
2 INHALANT RESPIRATORY (INHALATION) EVERY 6 HOURS PRN
Qty: 18 G | Refills: 1 | Status: SHIPPED | OUTPATIENT
Start: 2024-10-07

## 2024-10-10 ENCOUNTER — PATIENT MESSAGE (OUTPATIENT)
Dept: PRIMARY CARE | Facility: CLINIC | Age: 39
End: 2024-10-10
Payer: COMMERCIAL

## 2024-10-10 DIAGNOSIS — M79.672 LEFT FOOT PAIN: Primary | ICD-10-CM

## 2024-10-11 ENCOUNTER — HOSPITAL ENCOUNTER (OUTPATIENT)
Dept: RADIOLOGY | Facility: CLINIC | Age: 39
Discharge: HOME | End: 2024-10-11
Payer: COMMERCIAL

## 2024-10-11 DIAGNOSIS — M79.672 LEFT FOOT PAIN: ICD-10-CM

## 2024-10-11 PROCEDURE — 73630 X-RAY EXAM OF FOOT: CPT | Mod: LT

## 2024-10-28 ENCOUNTER — APPOINTMENT (OUTPATIENT)
Dept: PRIMARY CARE | Facility: CLINIC | Age: 39
End: 2024-10-28
Payer: COMMERCIAL

## 2024-10-28 DIAGNOSIS — Z23 ENCOUNTER FOR IMMUNIZATION: Primary | ICD-10-CM

## 2024-10-28 PROCEDURE — 90656 IIV3 VACC NO PRSV 0.5 ML IM: CPT | Performed by: STUDENT IN AN ORGANIZED HEALTH CARE EDUCATION/TRAINING PROGRAM

## 2024-10-28 PROCEDURE — 90471 IMMUNIZATION ADMIN: CPT | Performed by: STUDENT IN AN ORGANIZED HEALTH CARE EDUCATION/TRAINING PROGRAM

## 2024-11-12 ENCOUNTER — APPOINTMENT (OUTPATIENT)
Dept: OPHTHALMOLOGY | Age: 39
End: 2024-11-12
Payer: COMMERCIAL

## 2024-11-12 DIAGNOSIS — H52.203 MYOPIC ASTIGMATISM OF BOTH EYES: Primary | ICD-10-CM

## 2024-11-12 DIAGNOSIS — H52.13 MYOPIC ASTIGMATISM OF BOTH EYES: Primary | ICD-10-CM

## 2024-11-12 PROCEDURE — 99202 OFFICE O/P NEW SF 15 MIN: CPT | Performed by: OPHTHALMOLOGY

## 2024-11-12 ASSESSMENT — ENCOUNTER SYMPTOMS
ENDOCRINE NEGATIVE: 0
ALLERGIC/IMMUNOLOGIC NEGATIVE: 0
GASTROINTESTINAL NEGATIVE: 0
HEMATOLOGIC/LYMPHATIC NEGATIVE: 0
CONSTITUTIONAL NEGATIVE: 0
MUSCULOSKELETAL NEGATIVE: 0
PSYCHIATRIC NEGATIVE: 0
CARDIOVASCULAR NEGATIVE: 0
NEUROLOGICAL NEGATIVE: 0
RESPIRATORY NEGATIVE: 0
EYES NEGATIVE: 1

## 2024-11-12 ASSESSMENT — REFRACTION_MANIFEST
OD_AXIS: 055
OD_SPHERE: -1.00
OS_CYLINDER: -2.25
OS_AXIS: 114
OS_SPHERE: -0.75
METHOD_AUTOREFRACTION: 1
OD_CYLINDER: -0.25
OS_CYLINDER: -1.75
OD_SPHERE: -1.00
OS_AXIS: 120
OD_CYLINDER: SPHERE
OS_SPHERE: -0.50

## 2024-11-12 ASSESSMENT — TONOMETRY
OD_IOP_MMHG: 14
IOP_METHOD: GOLDMANN APPLANATION
OS_IOP_MMHG: 14

## 2024-11-12 ASSESSMENT — VISUAL ACUITY
OD_SC: 20/25
OS_PH_SC+: +2
OS_PH_SC: 20/25
METHOD: SNELLEN - LINEAR
OS_SC: 20/40

## 2024-11-12 ASSESSMENT — CUP TO DISC RATIO
OD_RATIO: 0.3
OS_RATIO: 0.3

## 2024-11-12 ASSESSMENT — CONF VISUAL FIELD
OD_SUPERIOR_TEMPORAL_RESTRICTION: 0
OS_NORMAL: 1
OS_INFERIOR_NASAL_RESTRICTION: 0
OD_SUPERIOR_NASAL_RESTRICTION: 0
OS_INFERIOR_TEMPORAL_RESTRICTION: 0
OD_INFERIOR_NASAL_RESTRICTION: 0
OD_NORMAL: 1
OS_SUPERIOR_NASAL_RESTRICTION: 0
OD_INFERIOR_TEMPORAL_RESTRICTION: 0
OS_SUPERIOR_TEMPORAL_RESTRICTION: 0

## 2024-11-12 ASSESSMENT — SLIT LAMP EXAM - LIDS
COMMENTS: NORMAL
COMMENTS: NORMAL

## 2024-11-12 ASSESSMENT — EXTERNAL EXAM - LEFT EYE: OS_EXAM: NORMAL

## 2024-11-12 ASSESSMENT — EXTERNAL EXAM - RIGHT EYE: OD_EXAM: NORMAL

## 2024-11-12 NOTE — PROGRESS NOTES
Assessment/Plan   Diagnoses and all orders for this visit:  Myopic astigmatism of both eyes    Glasses prescription given.    Follow up in 1 year

## 2024-11-24 ENCOUNTER — PATIENT MESSAGE (OUTPATIENT)
Dept: PRIMARY CARE | Facility: CLINIC | Age: 39
End: 2024-11-24
Payer: COMMERCIAL

## 2024-11-24 DIAGNOSIS — M25.511 ACUTE PAIN OF RIGHT SHOULDER: Primary | ICD-10-CM

## 2024-11-26 ENCOUNTER — HOSPITAL ENCOUNTER (OUTPATIENT)
Dept: RADIOLOGY | Facility: CLINIC | Age: 39
Discharge: HOME | End: 2024-11-26
Payer: COMMERCIAL

## 2024-11-26 DIAGNOSIS — M25.511 ACUTE PAIN OF RIGHT SHOULDER: ICD-10-CM

## 2024-11-26 PROCEDURE — 73030 X-RAY EXAM OF SHOULDER: CPT | Mod: RIGHT SIDE | Performed by: RADIOLOGY

## 2024-11-26 PROCEDURE — 73030 X-RAY EXAM OF SHOULDER: CPT | Mod: RT

## 2024-12-11 ENCOUNTER — APPOINTMENT (OUTPATIENT)
Dept: OBSTETRICS AND GYNECOLOGY | Facility: CLINIC | Age: 39
End: 2024-12-11
Payer: COMMERCIAL

## 2024-12-11 ENCOUNTER — APPOINTMENT (OUTPATIENT)
Dept: PODIATRY | Facility: CLINIC | Age: 39
End: 2024-12-11
Payer: COMMERCIAL

## 2024-12-11 VITALS
DIASTOLIC BLOOD PRESSURE: 60 MMHG | BODY MASS INDEX: 31.08 KG/M2 | HEIGHT: 71 IN | SYSTOLIC BLOOD PRESSURE: 118 MMHG | WEIGHT: 222 LBS

## 2024-12-11 DIAGNOSIS — E34.9 HORMONAL DISORDER: Primary | ICD-10-CM

## 2024-12-11 PROCEDURE — 83001 ASSAY OF GONADOTROPIN (FSH): CPT

## 2024-12-11 PROCEDURE — 84443 ASSAY THYROID STIM HORMONE: CPT

## 2024-12-11 PROCEDURE — 82670 ASSAY OF TOTAL ESTRADIOL: CPT

## 2024-12-11 PROCEDURE — 99214 OFFICE O/P EST MOD 30 MIN: CPT | Performed by: OBSTETRICS & GYNECOLOGY

## 2024-12-11 PROCEDURE — 1036F TOBACCO NON-USER: CPT | Performed by: OBSTETRICS & GYNECOLOGY

## 2024-12-11 PROCEDURE — 3008F BODY MASS INDEX DOCD: CPT | Performed by: OBSTETRICS & GYNECOLOGY

## 2024-12-12 ENCOUNTER — OFFICE VISIT (OUTPATIENT)
Dept: PRIMARY CARE | Facility: CLINIC | Age: 39
End: 2024-12-12
Payer: COMMERCIAL

## 2024-12-12 VITALS
HEIGHT: 71 IN | HEART RATE: 66 BPM | WEIGHT: 215 LBS | SYSTOLIC BLOOD PRESSURE: 120 MMHG | DIASTOLIC BLOOD PRESSURE: 82 MMHG | BODY MASS INDEX: 30.1 KG/M2

## 2024-12-12 DIAGNOSIS — L03.012 PARONYCHIA OF LEFT THUMB: Primary | ICD-10-CM

## 2024-12-12 DIAGNOSIS — S00.412A ABRASION OF LEFT EAR, INITIAL ENCOUNTER: ICD-10-CM

## 2024-12-12 DIAGNOSIS — K21.9 GASTROESOPHAGEAL REFLUX DISEASE, UNSPECIFIED WHETHER ESOPHAGITIS PRESENT: ICD-10-CM

## 2024-12-12 PROBLEM — S60.312A ABRASION OF LEFT THUMB: Status: ACTIVE | Noted: 2024-12-12

## 2024-12-12 LAB
ESTRADIOL SERPL-MCNC: 247 PG/ML
FSH SERPL-ACNC: 14.8 IU/L
TSH SERPL-ACNC: 2.31 MIU/L (ref 0.44–3.98)

## 2024-12-12 PROCEDURE — 3008F BODY MASS INDEX DOCD: CPT | Performed by: FAMILY MEDICINE

## 2024-12-12 PROCEDURE — 99214 OFFICE O/P EST MOD 30 MIN: CPT | Performed by: FAMILY MEDICINE

## 2024-12-12 PROCEDURE — 1036F TOBACCO NON-USER: CPT | Performed by: FAMILY MEDICINE

## 2024-12-12 RX ORDER — NEOMYCIN SULFATE, POLYMYXIN B SULFATE, HYDROCORTISONE 3.5; 10000; 1 MG/ML; [USP'U]/ML; MG/ML
3 SOLUTION/ DROPS AURICULAR (OTIC) 3 TIMES DAILY
Qty: 10 ML | Refills: 0 | Status: SHIPPED | OUTPATIENT
Start: 2024-12-12

## 2024-12-12 RX ORDER — OMEPRAZOLE 40 MG/1
40 CAPSULE, DELAYED RELEASE ORAL
Qty: 90 CAPSULE | Refills: 3 | Status: SHIPPED | OUTPATIENT
Start: 2024-12-12 | End: 2025-12-12

## 2024-12-12 RX ORDER — DOXYCYCLINE 100 MG/1
100 CAPSULE ORAL 2 TIMES DAILY
Qty: 14 CAPSULE | Refills: 0 | Status: SHIPPED | OUTPATIENT
Start: 2024-12-12 | End: 2024-12-19

## 2024-12-12 NOTE — PROGRESS NOTES
Subjective   Patient ID: Lucy Ann is a 39 y.o. female who presents for Earache (Left ear).    Past Medical, Surgical, and Family History reviewed and updated in chart.    Reviewed all medications by prescribing practitioner or clinical pharmacist (such as prescriptions, OTCs, herbal therapies and supplements) and documented in the medical record.    HPI  1.  Ear pain.  Lucy reports that she accidentally scratched her ear canal with her fingernail yesterday while attempting to remove some ear wax. Since then, she has been experiencing mild pain in her left ear canal. She applied hydrogen peroxide to the area yesterday but is not taking any over-the-counter medications for pain relief.    2.  Thumb inflammation.  Lucy states that she has a longstanding habit of biting her fingers, which has persisted for the past 20 years. She notes that her left thumb has been erythematous for the last 2 to 3 weeks as a result. She has tried using topical mupirocin, which seems to have helped. She denies using any over-the-counter medications for pain relief.    3.  Reflux disease.  Requesting refill of omeprazole, tolerates well, help symptoms.    Review of Systems  All pertinent positive symptoms are included in the history of present illness.    All other systems have been reviewed and are negative and noncontributory to this patient's current ailments.    History reviewed. No pertinent past medical history.  Past Surgical History:   Procedure Laterality Date    OTHER SURGICAL HISTORY  03/06/2019    Foot surgery    OTHER SURGICAL HISTORY  10/11/2022    Cholecystectomy    OTHER SURGICAL HISTORY  10/11/2022    Bunionectomy    TUBAL LIGATION       Social History     Tobacco Use    Smoking status: Never    Smokeless tobacco: Never   Vaping Use    Vaping status: Never Used   Substance Use Topics    Alcohol use: Yes     Comment: rare    Drug use: Not Currently     No family history on file.  Immunization History   Administered  "Date(s) Administered    Flu vaccine, trivalent, preservative free, age 6 months and greater (Fluarix/Fluzone/Flulaval) 10/28/2024    Pfizer Purple Cap SARS-CoV-2 04/24/2021, 05/15/2021, 11/21/2021    Tdap vaccine, age 7 year and older (BOOSTRIX, ADACEL) 11/26/2016, 12/29/2017, 09/21/2018     Current Outpatient Medications   Medication Instructions    albuterol (Proventil HFA) 90 mcg/actuation inhaler 2 puffs, inhalation, Every 6 hours PRN    benzonatate (TESSALON) 200 mg, oral, 3 times daily PRN, Do not crush or chew.    cholecalciferol (VITAMIN D-3) 50 mcg, oral, Daily    clonazePAM (KLONOPIN) 0.5 mg, oral, Daily PRN    clotrimazole-betamethasone (Lotrisone) cream 1 Application, Topical, 2 times daily    doxycycline (VIBRAMYCIN) 100 mg, oral, 2 times daily, Take with at least 8 ounces (large glass) of water, do not lie down for 30 minutes after    Dulera 200-5 mcg/actuation inhaler inhalation    famotidine (PEPCID) 20 mg, oral, Nightly    hydrocortisone (Anusol-HC) 2.5 % rectal cream rectal, 4 times daily PRN, Apply to affected areas    hydrocortisone (ANUSOL-HC) 25 mg, rectal, 2 times daily PRN    hydrOXYzine HCL (ATARAX) 50 mg, oral, Every 4 hours PRN    ibuprofen 600 mg tablet 1 tablet, oral, Every 6 hours PRN    neomycin-polymyxin-HC (Cortisporin) otic solution 3 drops, Left Ear, 3 times daily    nystatin (Mycostatin) 100,000 unit/gram powder 1 Application, Topical, 2 times daily    omeprazole (PRILOSEC) 40 mg, oral, Daily before breakfast    QUEtiapine (SEROQUEL) 25 mg, oral, 2 times daily    traZODone (DESYREL) 75 mg, oral, Nightly     Allergies   Allergen Reactions    Cephalexin Rash    Hydrocodone-Acetaminophen Itching    Lamotrigine Rash       Objective   Vitals:    12/12/24 1138   BP: 120/82   Pulse: 66   Weight: 97.5 kg (215 lb)   Height: 1.803 m (5' 11\")     Body mass index is 29.99 kg/m².    BP Readings from Last 3 Encounters:   12/12/24 120/82   12/11/24 118/60   09/17/24 102/78      Wt Readings from " Last 3 Encounters:   12/12/24 97.5 kg (215 lb)   12/11/24 101 kg (222 lb)   09/17/24 97.5 kg (215 lb)        Office Visit on 12/11/2024   Component Date Value    Thyroid Stimulating Horm* 12/11/2024 2.31     Follicle Stimulating Hor* 12/11/2024 14.8     Estradiol 12/11/2024 247      Physical Exam  CONSTITUTIONAL - well nourished, well developed, looks like stated age, in no acute distress, not ill-appearing, and not tired appearing  SKIN - normal skin color and pigmentation, normal skin turgor without rash, lesions, or nodules visualized  HEAD - no trauma, normocephalic  ENT - TM's intact, no injection, no signs of infection, uvula midline, normal tongue movement and throat normal, no exudate, nasal passage without discharge and patent, some dried blood noted on the external part of the in the left ear canal  CHEST - clear to auscultation, no wheezing, no crackles and no rales, good effort  CARDIAC - regular rate and regular rhythm, no skipped beats, no murmur  EXTREMITIES - no obvious or evident edema, no obvious or evident deformities, slight swelling, erythema, base of left thumb inferior to the cuticle, with some of the cuticle area damaged, reproducibly tender to light palpation  NEUROLOGICAL - normal gait, normal balance, normal motor, no ataxia, alert, oriented and no focal signs    Assessment/Plan   Problem List Items Addressed This Visit       GERD (gastroesophageal reflux disease)     Stable, no changes to medication recommended         Relevant Medications    omeprazole (PriLOSEC) 40 mg DR capsule    Abrasion of left ear     I do not notice any significant infection in the ear canal, but there appears to be some irritation on the external part of that left sided ear canal and therefore suggested the use of Cortisporin otic solution, 3 to 4 drops onto a cottonball, leave the cottonball on that area for about 10 minutes twice daily to 3 times daily         Relevant Medications    neomycin-polymyxin-HC  (Cortisporin) otic solution    Paronychia of left thumb - Primary     Left thumb has been erythematous following an incident of biting the thumb, and the use of Bactroban is not providing significant relief, so I suggested the use of oral doxycycline secondary to your allergies of Keflex, which should help alleviate this infection    Risks, benefits, and options of treatment(s) were discussed after reviewing all current medication(s) and drug allergy(ies)  I opted for the treatment that we discussed with instructions on the medication use for your underlying medical ailment(s)  I encouraged supportive care such as rest, fluids and Advil/Tylenol as warranted  Return to the clinic in 7-10 days or sooner if symptoms worsen or persist as we will then further evaluate         Relevant Medications    doxycycline (Vibramycin) 100 mg capsule

## 2024-12-12 NOTE — ASSESSMENT & PLAN NOTE
Left thumb has been erythematous following an incident of biting the thumb, and the use of Bactroban is not providing significant relief, so I suggested the use of oral doxycycline secondary to your allergies of Keflex, which should help alleviate this infection    Risks, benefits, and options of treatment(s) were discussed after reviewing all current medication(s) and drug allergy(ies)  I opted for the treatment that we discussed with instructions on the medication use for your underlying medical ailment(s)  I encouraged supportive care such as rest, fluids and Advil/Tylenol as warranted  Return to the clinic in 7-10 days or sooner if symptoms worsen or persist as we will then further evaluate

## 2024-12-12 NOTE — ASSESSMENT & PLAN NOTE
I do not notice any significant infection in the ear canal, but there appears to be some irritation on the external part of that left sided ear canal and therefore suggested the use of Cortisporin otic solution, 3 to 4 drops onto a cottonball, leave the cottonball on that area for about 10 minutes twice daily to 3 times daily

## 2024-12-26 ENCOUNTER — TELEPHONE (OUTPATIENT)
Dept: PRIMARY CARE | Facility: CLINIC | Age: 39
End: 2024-12-26
Payer: COMMERCIAL

## 2024-12-26 NOTE — TELEPHONE ENCOUNTER
Pt called back stating that her finger seems to be getting worse since her last apt with you. Can't you please advise on next steps?

## 2024-12-30 ENCOUNTER — OFFICE VISIT (OUTPATIENT)
Dept: PRIMARY CARE | Facility: CLINIC | Age: 39
End: 2024-12-30
Payer: COMMERCIAL

## 2024-12-30 VITALS — OXYGEN SATURATION: 97 % | DIASTOLIC BLOOD PRESSURE: 62 MMHG | SYSTOLIC BLOOD PRESSURE: 106 MMHG | HEART RATE: 67 BPM

## 2024-12-30 DIAGNOSIS — L03.019 PARONYCHIA OF FINGER, UNSPECIFIED LATERALITY: Primary | ICD-10-CM

## 2024-12-30 PROCEDURE — 1036F TOBACCO NON-USER: CPT | Performed by: STUDENT IN AN ORGANIZED HEALTH CARE EDUCATION/TRAINING PROGRAM

## 2024-12-30 PROCEDURE — 99214 OFFICE O/P EST MOD 30 MIN: CPT | Performed by: STUDENT IN AN ORGANIZED HEALTH CARE EDUCATION/TRAINING PROGRAM

## 2024-12-30 RX ORDER — SULFAMETHOXAZOLE AND TRIMETHOPRIM 800; 160 MG/1; MG/1
1 TABLET ORAL 2 TIMES DAILY
Qty: 14 TABLET | Refills: 0 | Status: SHIPPED | OUTPATIENT
Start: 2024-12-30 | End: 2025-01-06

## 2024-12-30 ASSESSMENT — PATIENT HEALTH QUESTIONNAIRE - PHQ9
2. FEELING DOWN, DEPRESSED OR HOPELESS: NOT AT ALL
1. LITTLE INTEREST OR PLEASURE IN DOING THINGS: NOT AT ALL
SUM OF ALL RESPONSES TO PHQ9 QUESTIONS 1 AND 2: 0

## 2024-12-30 NOTE — PROGRESS NOTES
Subjective   Patient ID: Lucy Ann is a 39 y.o. female who presents for Nail Problem.    HPI   She was seeing by Dr. Murphy in 12/12/2024 and was treated with doxycycline.   She used for 6 days and than she stopped the medication because she thought was not working.   Not sure when was the last dose of doxycycline  No pain, discharge, fever  She bites her nails  Review of Systems  .  All pertinent positive symptoms are included in the history of present illness.    All other systems have been reviewed and are negative and noncontributory to this patient's current ailments.  Objective   /62 (BP Location: Left arm, Patient Position: Sitting, BP Cuff Size: Adult)   Pulse 67   LMP  (LMP Unknown)   SpO2 97%     Physical Exam  General: Alert and oriented. Appears well-nourished and in no acute distress.  Respiratory/Thorax: Clear to auscultation bilaterally. No wheezing.   Cardiovascular: Regular rate and rhythm. No murmurs.  Extremities: Warm and well perfused. No peripheral edema.  Psychological: Appropriate mood and affect.   Skin: slightly erythema around the nail bed, no tenderness on palpation, no discharge.   Discoloration of the nails.     Assessment/Plan   Diagnoses and all orders for this visit:  Paronychia of finger, unspecified laterality  -     sulfamethoxazole-trimethoprim (Bactrim DS) 800-160 mg tablet; Take 1 tablet by mouth 2 times a day for 7 days.  -   if this is not helping your symptoms,please see a dermatologist.     Referral to Dermatology was placed.      I discussed my plan with my attending, Dr. Lobito Arambula. MD  Family medicine resident  PGY3

## 2025-01-20 ENCOUNTER — APPOINTMENT (OUTPATIENT)
Dept: PRIMARY CARE | Facility: CLINIC | Age: 40
End: 2025-01-20
Payer: COMMERCIAL

## 2025-01-20 VITALS — OXYGEN SATURATION: 97 % | DIASTOLIC BLOOD PRESSURE: 70 MMHG | HEART RATE: 103 BPM | SYSTOLIC BLOOD PRESSURE: 106 MMHG

## 2025-01-20 DIAGNOSIS — L03.019 PARONYCHIA OF FINGER, UNSPECIFIED LATERALITY: Primary | ICD-10-CM

## 2025-01-20 DIAGNOSIS — M25.511 CHRONIC RIGHT SHOULDER PAIN: ICD-10-CM

## 2025-01-20 DIAGNOSIS — G89.29 CHRONIC RIGHT SHOULDER PAIN: ICD-10-CM

## 2025-01-20 PROCEDURE — 99214 OFFICE O/P EST MOD 30 MIN: CPT | Performed by: STUDENT IN AN ORGANIZED HEALTH CARE EDUCATION/TRAINING PROGRAM

## 2025-01-20 PROCEDURE — 1036F TOBACCO NON-USER: CPT | Performed by: STUDENT IN AN ORGANIZED HEALTH CARE EDUCATION/TRAINING PROGRAM

## 2025-01-20 RX ORDER — NAPROXEN 500 MG/1
500 TABLET ORAL 2 TIMES DAILY PRN
Qty: 90 TABLET | Refills: 0 | Status: SHIPPED | OUTPATIENT
Start: 2025-01-20 | End: 2025-04-20

## 2025-01-20 NOTE — PROGRESS NOTES
Subjective   Patient ID: Lucy Ann is a 39 y.o. female who presents for Nail Problem.    HPI   She was seeing by Dr. Murphy in 12/12/2024 and was treated with doxycycline.   She was on it for 6 days at that and than she stopped the medication because she thought was not working.  She then came in on 12/30/2024 and was seen by me.  She was told to take Bactrim 1 tablet by mouth 2 times a day for 7 days and that if symptoms were not resolved to follow up with dermatology.   No pain, discharge, fever.  She bites her nails.  Has been biting her nails for over 30 years, states that this is the first on it this is having.    Shoulder pain  In addition patient is requesting a prescription for naproxen as she is going through physical therapy currently for her shoulder pain.    Review of Systems  .All pertinent positive symptoms are included in the history of present illness.    All other systems have been reviewed and are negative and noncontributory to this patient's current ailments.  Objective   /70 (BP Location: Left arm, Patient Position: Sitting, BP Cuff Size: Adult)   Pulse 103   SpO2 97%     Physical Exam  CONSTITUTIONAL - well nourished, well developed, looks like stated age, in no acute distress, not ill-appearing, and not tired appearing  SKIN - normal skin color and pigmentation, normal skin turgor without rash, lesions, or nodules visualized  HEAD - no trauma, normocephalic  EYES - normal external exam  CHEST - clear to auscultation, no wheezing, no crackles and no rales, good effort  CARDIAC - regular rate and regular rhythm, no skipped beats, no murmur  EXTREMITIES - no edema, no deformities  NEUROLOGICAL - normal gait, normal balance, normal motor, no ataxia, alert, oriented and no focal signs  PSYCHIATRIC - alert, pleasant and cordial, age-appropriate       Assessment/Plan   Diagnoses and all orders for this visit:  Paronychia of finger, unspecified laterality  Referral to Dermatology was  placed.   Please see them at your earliest convenience for a follow up.    Chronic right shoulder pain  A prescription for naproxen has been sent to your pharmacy.  Please continue the physical therapy.

## 2025-01-20 NOTE — PROGRESS NOTES
I reviewed the trainee's documentation and discussed the patient with the trainee. Although I did not see the patient, I agree with the trainee's medical decision making and plans, as documented in the trainee's note.

## 2025-01-22 ENCOUNTER — APPOINTMENT (OUTPATIENT)
Dept: DERMATOLOGY | Facility: CLINIC | Age: 40
End: 2025-01-22
Payer: COMMERCIAL

## 2025-01-22 ASSESSMENT — DERMATOLOGY QUALITY OF LIFE (QOL) ASSESSMENT
WHAT SINGLE SKIN CONDITION LISTED BELOW IS THE PATIENT ANSWERING THE QUALITY-OF-LIFE ASSESSMENT QUESTIONS ABOUT: NONE OF THE ABOVE
RATE HOW BOTHERED YOU ARE BY SYMPTOMS OF YOUR SKIN PROBLEM (EG, ITCHING, STINGING BURNING, HURTING OR SKIN IRRITATION): 3
RATE HOW BOTHERED YOU ARE BY EFFECTS OF YOUR SKIN PROBLEMS ON YOUR ACTIVITIES (EG, GOING OUT, ACCOMPLISHING WHAT YOU WANT, WORK ACTIVITIES OR YOUR RELATIONSHIPS WITH OTHERS): 1
RATE HOW BOTHERED YOU ARE BY SYMPTOMS OF YOUR SKIN PROBLEM (EG, ITCHING, STINGING BURNING, HURTING OR SKIN IRRITATION): 3
WHAT SINGLE SKIN CONDITION LISTED BELOW IS THE PATIENT ANSWERING THE QUALITY-OF-LIFE ASSESSMENT QUESTIONS ABOUT: NONE OF THE ABOVE
RATE HOW EMOTIONALLY BOTHERED YOU ARE BY YOUR SKIN PROBLEM (FOR EXAMPLE, WORRY, EMBARRASSMENT, FRUSTRATION): 2
RATE HOW BOTHERED YOU ARE BY EFFECTS OF YOUR SKIN PROBLEMS ON YOUR ACTIVITIES (EG, GOING OUT, ACCOMPLISHING WHAT YOU WANT, WORK ACTIVITIES OR YOUR RELATIONSHIPS WITH OTHERS): 1
RATE HOW EMOTIONALLY BOTHERED YOU ARE BY YOUR SKIN PROBLEM (FOR EXAMPLE, WORRY, EMBARRASSMENT, FRUSTRATION): 2

## 2025-01-29 ENCOUNTER — APPOINTMENT (OUTPATIENT)
Dept: DERMATOLOGY | Facility: CLINIC | Age: 40
End: 2025-01-29
Payer: COMMERCIAL

## 2025-01-29 DIAGNOSIS — L98.9 DERMATOLOGIC PROBLEM: Primary | ICD-10-CM

## 2025-01-29 DIAGNOSIS — L03.012 PARONYCHIA OF FINGER OF LEFT HAND: ICD-10-CM

## 2025-01-29 PROCEDURE — 1036F TOBACCO NON-USER: CPT | Performed by: DERMATOLOGY

## 2025-01-29 PROCEDURE — 99204 OFFICE O/P NEW MOD 45 MIN: CPT | Performed by: DERMATOLOGY

## 2025-01-29 RX ORDER — NYSTATIN AND TRIAMCINOLONE ACETONIDE 100000; 1 [USP'U]/G; MG/G
OINTMENT TOPICAL
Qty: 30 G | Refills: 1 | Status: SHIPPED | OUTPATIENT
Start: 2025-01-29

## 2025-01-29 RX ORDER — LAMOTRIGINE 150 MG/1
TABLET ORAL
COMMUNITY
Start: 2025-01-25
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
## 2025-02-18 ENCOUNTER — PATIENT MESSAGE (OUTPATIENT)
Dept: PRIMARY CARE | Facility: CLINIC | Age: 40
End: 2025-02-18
Payer: COMMERCIAL

## 2025-03-10 ENCOUNTER — PATIENT MESSAGE (OUTPATIENT)
Dept: PRIMARY CARE | Facility: CLINIC | Age: 40
End: 2025-03-10
Payer: COMMERCIAL

## 2025-03-30 ENCOUNTER — PATIENT MESSAGE (OUTPATIENT)
Dept: PRIMARY CARE | Facility: CLINIC | Age: 40
End: 2025-03-30
Payer: COMMERCIAL

## 2025-03-30 DIAGNOSIS — R05.1 ACUTE COUGH: Primary | ICD-10-CM

## 2025-03-31 RX ORDER — BENZONATATE 200 MG/1
200 CAPSULE ORAL 3 TIMES DAILY PRN
Qty: 42 CAPSULE | Refills: 0 | Status: SHIPPED | OUTPATIENT
Start: 2025-03-31 | End: 2025-04-30

## 2025-04-04 ENCOUNTER — OFFICE VISIT (OUTPATIENT)
Dept: PRIMARY CARE | Facility: CLINIC | Age: 40
End: 2025-04-04
Payer: COMMERCIAL

## 2025-04-04 VITALS
DIASTOLIC BLOOD PRESSURE: 80 MMHG | HEIGHT: 71 IN | BODY MASS INDEX: 32.06 KG/M2 | SYSTOLIC BLOOD PRESSURE: 126 MMHG | HEART RATE: 70 BPM | WEIGHT: 229 LBS

## 2025-04-04 DIAGNOSIS — M25.561 ACUTE PAIN OF RIGHT KNEE: Primary | ICD-10-CM

## 2025-04-04 PROCEDURE — 3008F BODY MASS INDEX DOCD: CPT | Performed by: STUDENT IN AN ORGANIZED HEALTH CARE EDUCATION/TRAINING PROGRAM

## 2025-04-04 PROCEDURE — 99213 OFFICE O/P EST LOW 20 MIN: CPT | Performed by: STUDENT IN AN ORGANIZED HEALTH CARE EDUCATION/TRAINING PROGRAM

## 2025-04-04 PROCEDURE — 1036F TOBACCO NON-USER: CPT | Performed by: STUDENT IN AN ORGANIZED HEALTH CARE EDUCATION/TRAINING PROGRAM

## 2025-04-04 RX ORDER — NAPROXEN 500 MG/1
500 TABLET ORAL 2 TIMES DAILY PRN
Qty: 60 TABLET | Refills: 0 | Status: SHIPPED | OUTPATIENT
Start: 2025-04-04 | End: 2025-07-03

## 2025-04-04 NOTE — PROGRESS NOTES
Lucy Ann is a 40 y.o. year old female presenting for Knee Pain       HPI:     Lucy Luong presents to the clinic with 2 day history of right knee pain. She states that she was mopping and lost her footing and landed on the lateral portion of her knee. She states that she felt her patella shift over medial and had instantaneous pain and discomfort to the entire knee. She denies radiation proximal or distal to the knee. She has been taking naproxen, resting and icing the knee without much luck.     She states that it is unbearable even when she is sitting on the couch. She enjoys working out and feels miserable not being able to enjoy it without feeling pain.       ROS:   All pertinent positive symptoms are included in history of present illness.    All other systems have been reviewed and are negative and noncontributory to this patient's current ailments.    Current Outpatient Medications   Medication Sig Dispense Refill    albuterol (Proventil HFA) 90 mcg/actuation inhaler Inhale 2 puffs every 6 hours if needed for wheezing. 18 g 1    benzonatate (Tessalon) 200 mg capsule Take 1 capsule (200 mg) by mouth 3 times a day as needed for cough. Do not crush or chew. 42 capsule 0    cholecalciferol (Vitamin D-3) 50 mcg (2,000 unit) capsule Take 1 capsule (50 mcg) by mouth once daily. (Patient not taking: Reported on 1/29/2025)      clonazePAM (KlonoPIN) 0.5 mg tablet Take 1 tablet (0.5 mg) by mouth once daily as needed for anxiety.      clotrimazole-betamethasone (Lotrisone) cream Apply 1 Application topically 2 times a day. 45 g 0    Dulera 200-5 mcg/actuation inhaler Inhale. (Patient not taking: Reported on 1/29/2025)      famotidine (Pepcid) 20 mg tablet Take 1 tablet (20 mg) by mouth once daily at bedtime. 90 tablet 3    hydrocortisone (Anusol-HC) 2.5 % rectal cream Insert into the rectum 4 times a day as needed for hemorrhoids (rectal discomfort). Apply to affected areas (Patient not taking: Reported on  "1/29/2025) 30 g 0    hydrocortisone (Anusol-HC) 25 mg suppository Insert 1 suppository (25 mg) into the rectum 2 times a day as needed for hemorrhoids. (Patient not taking: Reported on 1/29/2025) 12 suppository 0    hydrOXYzine HCL (Atarax) 50 mg tablet Take 1 tablet (50 mg) by mouth every 4 hours if needed for anxiety. 90 tablet 0    ibuprofen 600 mg tablet Take 1 tablet (600 mg) by mouth every 6 hours if needed.      lamoTRIgine (LaMICtal) 150 mg tablet       naproxen (Naprosyn) 500 mg tablet Take 1 tablet (500 mg) by mouth 2 times a day as needed for mild pain (1 - 3) (pain). 90 tablet 0    neomycin-polymyxin-HC (Cortisporin) otic solution Administer 3 drops into the left ear 3 times a day. (Patient not taking: Reported on 1/29/2025) 10 mL 0    nystatin-triamcinolone (Mycolog II) ointment Apply to affected nail folds twice daily 30 g 1    omeprazole (PriLOSEC) 40 mg DR capsule Take 1 capsule (40 mg) by mouth once daily in the morning. Take before meals. 90 capsule 3    QUEtiapine (SEROquel) 25 mg tablet Take 1 tablet (25 mg) by mouth 2 times a day. (Patient not taking: Reported on 1/29/2025)      traZODone (Desyrel) 50 mg tablet Take 1.5 tablets (75 mg) by mouth once daily at bedtime. 135 tablet 0     No current facility-administered medications for this visit.       OBJECTIVE  Visit Vitals  /80   Pulse 70   Ht 1.803 m (5' 11\")   Wt 104 kg (229 lb)   BMI 31.94 kg/m²   OB Status Having periods   Smoking Status Never   BSA 2.28 m²        Physical Exam:  GENERAL: Alert, oriented, pleasant, in no acute distress  Right knee: no edema, erythema. Positive for valgus test, negative for varus, Thessaly test, lachman's, anterior/posterior drawer test.   Left knee: no acute findings.   EXTREMITIES: no edema, no cyanosis  PSYCH: Appropriate mood and affect  SKIN: No rashes or lesions appreciated    Assessment/Plan   Diagnoses and all orders for this visit:  Acute pain of right knee  -     XR knee right 4+ views; " Future  - Based on the history provided, I am going to order an x-ray to check for breaks or fractures. Most likely you bruised the knee during the fall and/or sprained your MCL. I recommend you continue to use NSAIDs, sent a prescription for naproxen, ice the area, and wrap it. The injury will heal but we will wait on imaging to further identify what might be the root cause.     - Please come back to see me if the pain does not subside. I will go ahead and send a [employer] message or call you with the results of imaging if there are any abnormal findings.

## 2025-04-21 ENCOUNTER — PATIENT MESSAGE (OUTPATIENT)
Dept: PRIMARY CARE | Facility: CLINIC | Age: 40
End: 2025-04-21
Payer: COMMERCIAL

## 2025-04-21 DIAGNOSIS — M25.561 ACUTE PAIN OF RIGHT KNEE: Primary | ICD-10-CM

## 2025-04-21 RX ORDER — DICLOFENAC SODIUM 10 MG/G
4 GEL TOPICAL 4 TIMES DAILY
Qty: 100 G | Refills: 1 | Status: SHIPPED | OUTPATIENT
Start: 2025-04-21

## 2025-04-22 ENCOUNTER — APPOINTMENT (OUTPATIENT)
Dept: PRIMARY CARE | Facility: CLINIC | Age: 40
End: 2025-04-22
Payer: COMMERCIAL

## 2025-04-22 ENCOUNTER — OFFICE VISIT (OUTPATIENT)
Dept: PRIMARY CARE | Facility: CLINIC | Age: 40
End: 2025-04-22
Payer: COMMERCIAL

## 2025-04-22 VITALS
WEIGHT: 230 LBS | DIASTOLIC BLOOD PRESSURE: 80 MMHG | BODY MASS INDEX: 32.2 KG/M2 | SYSTOLIC BLOOD PRESSURE: 130 MMHG | OXYGEN SATURATION: 98 % | HEIGHT: 71 IN | HEART RATE: 67 BPM

## 2025-04-22 DIAGNOSIS — Z13.9 SCREENING FOR CONDITION: ICD-10-CM

## 2025-04-22 DIAGNOSIS — Z13.29 SCREENING FOR ENDOCRINE DISORDER: ICD-10-CM

## 2025-04-22 DIAGNOSIS — G89.29 CHRONIC RIGHT HIP PAIN: ICD-10-CM

## 2025-04-22 DIAGNOSIS — F31.9 BIPOLAR I DISORDER, CURRENT EPISODE DEPRESSED (MULTI): ICD-10-CM

## 2025-04-22 DIAGNOSIS — E55.9 VITAMIN D DEFICIENCY: ICD-10-CM

## 2025-04-22 DIAGNOSIS — Z13.1 SCREENING FOR DIABETES MELLITUS: ICD-10-CM

## 2025-04-22 DIAGNOSIS — Z00.00 ENCOUNTER FOR PREVENTIVE HEALTH EXAMINATION: Primary | ICD-10-CM

## 2025-04-22 DIAGNOSIS — K59.00 CONSTIPATION, UNSPECIFIED CONSTIPATION TYPE: ICD-10-CM

## 2025-04-22 DIAGNOSIS — Z12.31 VISIT FOR SCREENING MAMMOGRAM: ICD-10-CM

## 2025-04-22 DIAGNOSIS — M25.551 CHRONIC RIGHT HIP PAIN: ICD-10-CM

## 2025-04-22 DIAGNOSIS — Z13.6 SCREENING FOR CARDIOVASCULAR CONDITION: ICD-10-CM

## 2025-04-22 PROBLEM — M25.561 RIGHT KNEE PAIN: Status: RESOLVED | Noted: 2023-04-04 | Resolved: 2025-04-22

## 2025-04-22 PROBLEM — R09.81 NASAL CONGESTION: Status: RESOLVED | Noted: 2024-03-29 | Resolved: 2025-04-22

## 2025-04-22 PROBLEM — L30.4 INTERTRIGO: Status: RESOLVED | Noted: 2024-03-29 | Resolved: 2025-04-22

## 2025-04-22 PROBLEM — E46 MALNUTRITION (MULTI): Status: RESOLVED | Noted: 2024-03-29 | Resolved: 2025-04-22

## 2025-04-22 PROBLEM — S46.812A STRAIN OF LEFT SUBSCAPULARIS MUSCLE: Status: RESOLVED | Noted: 2024-09-06 | Resolved: 2025-04-22

## 2025-04-22 PROBLEM — S60.312A ABRASION OF LEFT THUMB: Status: RESOLVED | Noted: 2024-12-12 | Resolved: 2025-04-22

## 2025-04-22 PROBLEM — S76.312A HAMSTRING STRAIN, LEFT, INITIAL ENCOUNTER: Status: RESOLVED | Noted: 2024-03-29 | Resolved: 2025-04-22

## 2025-04-22 PROBLEM — M75.41 IMPINGEMENT SYNDROME OF RIGHT SHOULDER: Status: RESOLVED | Noted: 2024-03-29 | Resolved: 2025-04-22

## 2025-04-22 PROBLEM — B35.1 ONYCHOMYCOSIS OF TOENAIL: Status: RESOLVED | Noted: 2024-03-29 | Resolved: 2025-04-22

## 2025-04-22 PROBLEM — S00.412A ABRASION OF LEFT EAR: Status: RESOLVED | Noted: 2024-12-12 | Resolved: 2025-04-22

## 2025-04-22 PROBLEM — S99.921D RIGHT FOOT INJURY, SUBSEQUENT ENCOUNTER: Status: RESOLVED | Noted: 2024-03-29 | Resolved: 2025-04-22

## 2025-04-22 PROBLEM — L03.012 PARONYCHIA OF LEFT THUMB: Status: RESOLVED | Noted: 2024-12-12 | Resolved: 2025-04-22

## 2025-04-22 PROBLEM — M25.511 ACUTE PAIN OF RIGHT SHOULDER: Status: RESOLVED | Noted: 2024-03-29 | Resolved: 2025-04-22

## 2025-04-22 PROBLEM — J06.9 UPPER RESPIRATORY TRACT INFECTION: Status: RESOLVED | Noted: 2024-03-29 | Resolved: 2025-04-22

## 2025-04-22 PROBLEM — R23.4 SCAB: Status: RESOLVED | Noted: 2024-03-29 | Resolved: 2025-04-22

## 2025-04-22 PROBLEM — M79.675 GREAT TOE PAIN, LEFT: Status: RESOLVED | Noted: 2024-03-29 | Resolved: 2025-04-22

## 2025-04-22 PROBLEM — L29.9 ITCHING: Status: RESOLVED | Noted: 2024-03-29 | Resolved: 2025-04-22

## 2025-04-22 PROCEDURE — 1036F TOBACCO NON-USER: CPT | Performed by: STUDENT IN AN ORGANIZED HEALTH CARE EDUCATION/TRAINING PROGRAM

## 2025-04-22 PROCEDURE — 99396 PREV VISIT EST AGE 40-64: CPT | Performed by: STUDENT IN AN ORGANIZED HEALTH CARE EDUCATION/TRAINING PROGRAM

## 2025-04-22 PROCEDURE — 3008F BODY MASS INDEX DOCD: CPT | Performed by: STUDENT IN AN ORGANIZED HEALTH CARE EDUCATION/TRAINING PROGRAM

## 2025-04-22 RX ORDER — ACETAMINOPHEN 500 MG
1000 TABLET ORAL EVERY 6 HOURS PRN
Qty: 90 TABLET | Refills: 2 | Status: SHIPPED | OUTPATIENT
Start: 2025-04-22

## 2025-04-22 RX ORDER — IBUPROFEN 600 MG/1
600 TABLET ORAL EVERY 6 HOURS PRN
Qty: 90 TABLET | Refills: 2 | Status: SHIPPED | OUTPATIENT
Start: 2025-04-22

## 2025-04-22 RX ORDER — POLYETHYLENE GLYCOL 3350 17 G/17G
17 POWDER, FOR SOLUTION ORAL DAILY
Qty: 90 PACKET | Refills: 3 | Status: SHIPPED | OUTPATIENT
Start: 2025-04-22 | End: 2026-04-22

## 2025-04-22 RX ORDER — NAPROXEN 500 MG/1
500 TABLET ORAL 2 TIMES DAILY PRN
Qty: 90 TABLET | Refills: 2 | Status: SHIPPED | OUTPATIENT
Start: 2025-04-22

## 2025-04-22 ASSESSMENT — PATIENT HEALTH QUESTIONNAIRE - PHQ9
1. LITTLE INTEREST OR PLEASURE IN DOING THINGS: NEARLY EVERY DAY
7. TROUBLE CONCENTRATING ON THINGS, SUCH AS READING THE NEWSPAPER OR WATCHING TELEVISION: NOT AT ALL
8. MOVING OR SPEAKING SO SLOWLY THAT OTHER PEOPLE COULD HAVE NOTICED. OR THE OPPOSITE, BEING SO FIGETY OR RESTLESS THAT YOU HAVE BEEN MOVING AROUND A LOT MORE THAN USUAL: NOT AT ALL
5. POOR APPETITE OR OVEREATING: SEVERAL DAYS
6. FEELING BAD ABOUT YOURSELF - OR THAT YOU ARE A FAILURE OR HAVE LET YOURSELF OR YOUR FAMILY DOWN: SEVERAL DAYS
10. IF YOU CHECKED OFF ANY PROBLEMS, HOW DIFFICULT HAVE THESE PROBLEMS MADE IT FOR YOU TO DO YOUR WORK, TAKE CARE OF THINGS AT HOME, OR GET ALONG WITH OTHER PEOPLE: SOMEWHAT DIFFICULT
9. THOUGHTS THAT YOU WOULD BE BETTER OFF DEAD, OR OF HURTING YOURSELF: SEVERAL DAYS
SUM OF ALL RESPONSES TO PHQ9 QUESTIONS 1 AND 2: 6
SUM OF ALL RESPONSES TO PHQ QUESTIONS 1-9: 11
3. TROUBLE FALLING OR STAYING ASLEEP OR SLEEPING TOO MUCH: SEVERAL DAYS
2. FEELING DOWN, DEPRESSED OR HOPELESS: NEARLY EVERY DAY
4. FEELING TIRED OR HAVING LITTLE ENERGY: SEVERAL DAYS

## 2025-04-22 ASSESSMENT — ANXIETY QUESTIONNAIRES
5. BEING SO RESTLESS THAT IT IS HARD TO SIT STILL: SEVERAL DAYS
4. TROUBLE RELAXING: SEVERAL DAYS
IF YOU CHECKED OFF ANY PROBLEMS ON THIS QUESTIONNAIRE, HOW DIFFICULT HAVE THESE PROBLEMS MADE IT FOR YOU TO DO YOUR WORK, TAKE CARE OF THINGS AT HOME, OR GET ALONG WITH OTHER PEOPLE: SOMEWHAT DIFFICULT
1. FEELING NERVOUS, ANXIOUS, OR ON EDGE: SEVERAL DAYS
3. WORRYING TOO MUCH ABOUT DIFFERENT THINGS: SEVERAL DAYS
7. FEELING AFRAID AS IF SOMETHING AWFUL MIGHT HAPPEN: NOT AT ALL
6. BECOMING EASILY ANNOYED OR IRRITABLE: SEVERAL DAYS
2. NOT BEING ABLE TO STOP OR CONTROL WORRYING: SEVERAL DAYS
GAD7 TOTAL SCORE: 6

## 2025-04-22 NOTE — PROGRESS NOTES
"Subjective   Patient ID: Lucy Ann is a 40 y.o. female who presents for Annual Exam.    HPI  Working on a making diet changes.  Exercises regularly.  Mammogram due  PAP is not up to date.  Vaccines are up to date.  Dental exam is up to date.  Vision exam is up to date.  Patient is not fasting for labs today.   Social: Tobacco use- nonsmoker      Alcohol use- very rare    Other concerns addressed today include:   She is wondering what else can be done for this chronic right hip pain.  She cannot do many physical therapy at this time due to time constraints and financial problems.  She is wondering about seeing sports medicine at this time.    She is requesting refill of acetaminophen, ibuprofen, naproxen and MiraLAX at this time.    Review of Systems  All pertinent positive symptoms are included in the history of present illness.    All other systems have been reviewed and are negative and noncontributory to this patient's current ailments.     Social History     Tobacco Use    Smoking status: Never    Smokeless tobacco: Never   Substance Use Topics    Alcohol use: Yes     Comment: rare      Surgical History[1]   Allergies[2]     Current Medications[3]     Problem List[4]   Immunization History   Administered Date(s) Administered    Flu vaccine, trivalent, preservative free, age 6 months and greater (Fluarix/Fluzone/Flulaval) 10/28/2024    Pfizer Purple Cap SARS-CoV-2 04/24/2021, 05/15/2021, 11/21/2021    Tdap vaccine, age 7 year and older (BOOSTRIX, ADACEL) 11/26/2016, 12/29/2017, 09/21/2018       Objective   VITALS  /80   Pulse 67   Ht 1.803 m (5' 11\")   Wt 104 kg (230 lb)   SpO2 98%   BMI 32.08 kg/m²      Physical Exam  CONSTITUTIONAL - well nourished, well developed, looks like stated age, in no acute distress, not ill-appearing  SKIN - normal skin color and pigmentation, normal skin turgor without rash, lesions, or nodules visualized  HEAD - no trauma, normocephalic  EYES - pupils are equal and " reactive to light, extraocular muscles are intact, and normal external exam  ENT - TM's intact, no injection, no signs of infection, uvula midline, normal tongue movement and throat normal, no exudate, nasal passage without discharge and patent  NECK - supple without rigidity, no neck mass was observed, no thyromegaly or thyroid nodules  CHEST - clear to auscultation, no wheezing, no crackles and no rales, good effort  CARDIAC - regular rate and regular rhythm, no skipped beats, no murmur  ABDOMEN - no organomegaly, soft, nontender, nondistended, normal bowel sounds, no guarding/rebound/rigidity  EXTREMITIES - no edema, no deformities  NEUROLOGICAL - normal gait, normal balance, normal motor, no ataxia, DTRs equal and symmetrical; alert, oriented and no focal signs  PSYCHIATRIC - alert, pleasant and cordial, age-appropriate  IMMUNOLOGIC - no cervical lymphadenopathy     Assessment/Plan   Diagnoses and all orders for this visit:  Encounter for preventive health examination  A complete history and physical was performed today.  Vaccines are up to date.  Mammogram ordered  PAP is not up to date.  Will be holding off on this due to trauma history  Fasting labs ordered today include:  -     CBC; Future  -     Comprehensive Metabolic Panel; Future  -     Hemoglobin A1C; Future  -     Lipid Panel; Future  -     TSH with reflex to Free T4 if abnormal; Future  Visit for screening mammogram  -     BI mammo bilateral screening tomosynthesis; Future  Screening for condition  -     CBC; Future  -     Comprehensive Metabolic Panel; Future  Screening for diabetes mellitus  -     Hemoglobin A1C; Future  Screening for cardiovascular condition  -     Lipid Panel; Future  Screening for endocrine disorder  -     TSH with reflex to Free T4 if abnormal; Future  Vitamin D deficiency  -     Vitamin D 25-Hydroxy,Total (for eval of Vitamin D levels); Future  Bipolar I disorder, current episode depressed (Multi)  Stable, she is actively  having suicidal ideation without plan.  She does have therapy sessions weekly and she states that she is safe at this time at home with follow-up with her therapist today.  Chronic right hip pain  Since she does not have time for physical therapy at this time, I refilled her naproxen, ibuprofen and Tylenol and referred her to sports medicine for possible hip injection.  She will need repeat x-ray of that hip  -     Referral to Orthopedics and Sports Medicine; Future  -     naproxen (Naprosyn) 500 mg tablet; Take 1 tablet (500 mg) by mouth 2 times a day as needed for mild pain (1 - 3) (pain).  -     ibuprofen 600 mg tablet; Take 1 tablet (600 mg) by mouth every 6 hours if needed for mild pain (1 - 3).  -     acetaminophen (Tylenol Extra Strength) 500 mg tablet; Take 2 tablets (1,000 mg) by mouth every 6 hours if needed for mild pain (1 - 3).  Constipation, unspecified constipation type  -     Refill polyethylene glycol (Glycolax, Miralax) 17 gram packet; Take 17 g by mouth once daily. Mix 1 cap (17g) into 8 ounces of fluid.    Return in 1 year and sooner as needed         [1]   Past Surgical History:  Procedure Laterality Date    OTHER SURGICAL HISTORY  03/06/2019    Foot surgery    OTHER SURGICAL HISTORY  10/11/2022    Cholecystectomy    OTHER SURGICAL HISTORY  10/11/2022    Bunionectomy    TUBAL LIGATION     [2]   Allergies  Allergen Reactions    Cephalexin Rash    Hydrocodone-Acetaminophen Itching    Lamotrigine Rash   [3]   Current Outpatient Medications   Medication Sig Dispense Refill    albuterol (Proventil HFA) 90 mcg/actuation inhaler Inhale 2 puffs every 6 hours if needed for wheezing. 18 g 1    benzonatate (Tessalon) 200 mg capsule Take 1 capsule (200 mg) by mouth 3 times a day as needed for cough. Do not crush or chew. 42 capsule 0    clonazePAM (KlonoPIN) 0.5 mg tablet Take 1 tablet (0.5 mg) by mouth once daily as needed for anxiety.      clotrimazole-betamethasone (Lotrisone) cream Apply 1 Application  topically 2 times a day. 45 g 0    diclofenac sodium (Voltaren) 1 % gel Apply 4.5 inches (4 g) topically 4 times a day. 100 g 1    famotidine (Pepcid) 20 mg tablet Take 1 tablet (20 mg) by mouth once daily at bedtime. 90 tablet 3    hydrOXYzine HCL (Atarax) 50 mg tablet Take 1 tablet (50 mg) by mouth every 4 hours if needed for anxiety. 90 tablet 0    ibuprofen 600 mg tablet Take 1 tablet (600 mg) by mouth every 6 hours if needed.      lamoTRIgine (LaMICtal) 150 mg tablet       naproxen (Naprosyn) 500 mg tablet Take 1 tablet (500 mg) by mouth 2 times a day as needed for mild pain (1 - 3) (pain). 60 tablet 0    nystatin-triamcinolone (Mycolog II) ointment Apply to affected nail folds twice daily 30 g 1    omeprazole (PriLOSEC) 40 mg DR capsule Take 1 capsule (40 mg) by mouth once daily in the morning. Take before meals. 90 capsule 3    traZODone (Desyrel) 50 mg tablet Take 1.5 tablets (75 mg) by mouth once daily at bedtime. 135 tablet 0     No current facility-administered medications for this visit.   [4]   Patient Active Problem List  Diagnosis    Abdominal pannus    Chronic low back pain    Anxiety    Asthma, exercise induced    Borderline personality disorder (Multi)    Post-traumatic stress disorder, unspecified    GERD (gastroesophageal reflux disease)    Goiter    PCOS (polycystic ovarian syndrome)    Peripheral neuropathy    Chronic insomnia    Vitamin D deficiency    Bipolar I disorder, current episode depressed (Multi)    Anovulation    Fatigue    Maltracking of right patella    Polyphagia    Poor dentition    Unexplained weight gain

## 2025-05-05 ENCOUNTER — APPOINTMENT (OUTPATIENT)
Dept: DERMATOLOGY | Facility: CLINIC | Age: 40
End: 2025-05-05
Payer: COMMERCIAL

## 2025-05-08 ENCOUNTER — PATIENT MESSAGE (OUTPATIENT)
Dept: PRIMARY CARE | Facility: CLINIC | Age: 40
End: 2025-05-08
Payer: COMMERCIAL

## 2025-05-08 DIAGNOSIS — E55.9 VITAMIN D DEFICIENCY: Primary | ICD-10-CM

## 2025-05-08 LAB
25(OH)D3+25(OH)D2 SERPL-MCNC: 26 NG/ML (ref 30–100)
ALBUMIN SERPL-MCNC: 4.3 G/DL (ref 3.6–5.1)
ALP SERPL-CCNC: 50 U/L (ref 31–125)
ALT SERPL-CCNC: 11 U/L (ref 6–29)
ANION GAP SERPL CALCULATED.4IONS-SCNC: 8 MMOL/L (CALC) (ref 7–17)
AST SERPL-CCNC: 14 U/L (ref 10–30)
BILIRUB SERPL-MCNC: 0.5 MG/DL (ref 0.2–1.2)
BUN SERPL-MCNC: 17 MG/DL (ref 7–25)
CALCIUM SERPL-MCNC: 9.3 MG/DL (ref 8.6–10.2)
CHLORIDE SERPL-SCNC: 105 MMOL/L (ref 98–110)
CHOLEST SERPL-MCNC: 164 MG/DL
CHOLEST/HDLC SERPL: 4.7 (CALC)
CO2 SERPL-SCNC: 27 MMOL/L (ref 20–32)
CREAT SERPL-MCNC: 0.88 MG/DL (ref 0.5–0.99)
EGFRCR SERPLBLD CKD-EPI 2021: 84 ML/MIN/1.73M2
ERYTHROCYTE [DISTWIDTH] IN BLOOD BY AUTOMATED COUNT: 14 % (ref 11–15)
EST. AVERAGE GLUCOSE BLD GHB EST-MCNC: 94 MG/DL
EST. AVERAGE GLUCOSE BLD GHB EST-SCNC: 5.2 MMOL/L
GLUCOSE SERPL-MCNC: 78 MG/DL (ref 65–99)
HBA1C MFR BLD: 4.9 %
HCT VFR BLD AUTO: 45.3 % (ref 35–45)
HDLC SERPL-MCNC: 35 MG/DL
HGB BLD-MCNC: 14.2 G/DL (ref 11.7–15.5)
LDLC SERPL CALC-MCNC: 104 MG/DL (CALC)
MCH RBC QN AUTO: 28 PG (ref 27–33)
MCHC RBC AUTO-ENTMCNC: 31.3 G/DL (ref 32–36)
MCV RBC AUTO: 89.3 FL (ref 80–100)
NONHDLC SERPL-MCNC: 129 MG/DL (CALC)
PLATELET # BLD AUTO: 198 THOUSAND/UL (ref 140–400)
PMV BLD REES-ECKER: 12.1 FL (ref 7.5–12.5)
POTASSIUM SERPL-SCNC: 4.2 MMOL/L (ref 3.5–5.3)
PROT SERPL-MCNC: 6.8 G/DL (ref 6.1–8.1)
RBC # BLD AUTO: 5.07 MILLION/UL (ref 3.8–5.1)
SODIUM SERPL-SCNC: 140 MMOL/L (ref 135–146)
TRIGL SERPL-MCNC: 157 MG/DL
TSH SERPL-ACNC: 1.86 MIU/L
WBC # BLD AUTO: 5.4 THOUSAND/UL (ref 3.8–10.8)

## 2025-05-08 RX ORDER — VIT C/E/ZN/COPPR/LUTEIN/ZEAXAN 250MG-90MG
125 CAPSULE ORAL DAILY
Qty: 90 CAPSULE | Refills: 3 | Status: SHIPPED | OUTPATIENT
Start: 2025-05-08 | End: 2026-05-08

## 2025-05-08 RX ORDER — ERGOCALCIFEROL 1.25 MG/1
1.25 CAPSULE ORAL WEEKLY
Qty: 12 CAPSULE | Refills: 0 | Status: CANCELLED | OUTPATIENT
Start: 2025-05-08 | End: 2025-07-31

## 2025-05-29 ENCOUNTER — HOSPITAL ENCOUNTER (OUTPATIENT)
Dept: RADIOLOGY | Facility: EXTERNAL LOCATION | Age: 40
Discharge: HOME | End: 2025-05-29

## 2025-06-02 ENCOUNTER — APPOINTMENT (OUTPATIENT)
Dept: ORTHOPEDIC SURGERY | Age: 40
End: 2025-06-02
Payer: COMMERCIAL

## 2025-06-02 VITALS — WEIGHT: 230 LBS | BODY MASS INDEX: 32.2 KG/M2 | HEIGHT: 71 IN

## 2025-06-02 DIAGNOSIS — M25.461 EFFUSION OF RIGHT KNEE: Primary | ICD-10-CM

## 2025-06-02 DIAGNOSIS — M25.551 CHRONIC RIGHT HIP PAIN: ICD-10-CM

## 2025-06-02 DIAGNOSIS — G89.29 CHRONIC RIGHT HIP PAIN: ICD-10-CM

## 2025-06-02 PROCEDURE — 99244 OFF/OP CNSLTJ NEW/EST MOD 40: CPT | Performed by: EMERGENCY MEDICINE

## 2025-06-02 PROCEDURE — 3008F BODY MASS INDEX DOCD: CPT | Performed by: EMERGENCY MEDICINE

## 2025-06-02 PROCEDURE — 1036F TOBACCO NON-USER: CPT | Performed by: EMERGENCY MEDICINE

## 2025-06-02 RX ORDER — MELOXICAM 15 MG/1
15 TABLET ORAL DAILY
Qty: 30 TABLET | Refills: 0 | Status: SHIPPED | OUTPATIENT
Start: 2025-06-02 | End: 2025-07-02

## 2025-06-02 ASSESSMENT — PAIN SCALES - GENERAL: PAINLEVEL_OUTOF10: 3

## 2025-06-02 ASSESSMENT — PAIN - FUNCTIONAL ASSESSMENT: PAIN_FUNCTIONAL_ASSESSMENT: 0-10

## 2025-06-03 ENCOUNTER — HOSPITAL ENCOUNTER (OUTPATIENT)
Dept: RADIOLOGY | Facility: CLINIC | Age: 40
Discharge: HOME | End: 2025-06-03
Payer: COMMERCIAL

## 2025-06-03 DIAGNOSIS — M25.461 EFFUSION OF RIGHT KNEE: ICD-10-CM

## 2025-06-03 PROCEDURE — 73721 MRI JNT OF LWR EXTRE W/O DYE: CPT | Mod: RT

## 2025-06-03 PROCEDURE — 73721 MRI JNT OF LWR EXTRE W/O DYE: CPT | Mod: RIGHT SIDE | Performed by: STUDENT IN AN ORGANIZED HEALTH CARE EDUCATION/TRAINING PROGRAM

## 2025-06-10 ENCOUNTER — APPOINTMENT (OUTPATIENT)
Dept: OPHTHALMOLOGY | Age: 40
End: 2025-06-10
Payer: COMMERCIAL

## 2025-06-19 ENCOUNTER — APPOINTMENT (OUTPATIENT)
Dept: OPHTHALMOLOGY | Age: 40
End: 2025-06-19
Payer: COMMERCIAL

## 2025-06-19 ENCOUNTER — OFFICE VISIT (OUTPATIENT)
Dept: ORTHOPEDIC SURGERY | Facility: HOSPITAL | Age: 40
End: 2025-06-19
Payer: COMMERCIAL

## 2025-06-19 VITALS — WEIGHT: 226 LBS | BODY MASS INDEX: 31.64 KG/M2 | HEIGHT: 71 IN

## 2025-06-19 DIAGNOSIS — M17.11 PATELLOFEMORAL ARTHRITIS OF RIGHT KNEE: Primary | ICD-10-CM

## 2025-06-19 PROCEDURE — 3008F BODY MASS INDEX DOCD: CPT | Performed by: STUDENT IN AN ORGANIZED HEALTH CARE EDUCATION/TRAINING PROGRAM

## 2025-06-19 PROCEDURE — 99202 OFFICE O/P NEW SF 15 MIN: CPT | Performed by: STUDENT IN AN ORGANIZED HEALTH CARE EDUCATION/TRAINING PROGRAM

## 2025-06-19 PROCEDURE — 99204 OFFICE O/P NEW MOD 45 MIN: CPT | Performed by: STUDENT IN AN ORGANIZED HEALTH CARE EDUCATION/TRAINING PROGRAM

## 2025-06-19 PROCEDURE — 1036F TOBACCO NON-USER: CPT | Performed by: STUDENT IN AN ORGANIZED HEALTH CARE EDUCATION/TRAINING PROGRAM

## 2025-06-19 ASSESSMENT — PAIN SCALES - GENERAL: PAINLEVEL_OUTOF10: 4

## 2025-06-19 ASSESSMENT — PAIN - FUNCTIONAL ASSESSMENT: PAIN_FUNCTIONAL_ASSESSMENT: 0-10

## 2025-06-19 NOTE — PROGRESS NOTES
PRIMARY CARE PHYSICIAN: Hyacinth Goldstein DO  REFERRING PROVIDER: No referring provider defined for this encounter.     CONSULT ORTHOPAEDIC: Knee Evaluation    ASSESSMENT & PLAN    Impression: 40 y.o. female with right knee patellofemoral osteoarthritis.    Plan:   I explained to the patient the nature of their diagnosis.  I reviewed their imaging studies with them.    Based on the history, physical exam and imaging studies above, the patient's presentation is consistent with the above diagnosis.  I had a long discussion with the patient regarding their presentation and the treatment options.  We discussed initial nonoperative versus operative management options as well as potential further diagnostic imaging.  I reviewed the patient's MRI findings with her.  She has a small nondisplaced undersurface medial meniscus tear, however I do not believe this is contributing significantly to her symptoms.  The majority of her symptoms seem to be coming from her patellofemoral joint and her early patellofemoral osteoarthritis.  I recommend continued nonoperative management for this.  We discussed the importance of quadricep strength and good dynamic knee control.  She will ice and elevate.  She will focus on low impact activities.  We discussed the importance of good weight management.  I recommend continued nonoperative management including possible hyaluronic acid injections to help as well with a lubricant and anti-inflammatory.    Follow-Up: Patient will follow-up as needed    At the end of the visit, all questions were answered in full. The patient is in agreement with the plan and recommendations. They will call the office with any questions/concerns.    Note dictated with Minekey software. Completed without full typed error editing and sent to avoid delay.       SUBJECTIVE  CHIEF COMPLAINT:   Chief Complaint   Patient presents with    Right Knee - Pain        HPI: Lucy Ann is a 40 y.o.  female who presents today with right knee pain, referred by Dr. Zamorano. Lucy had an MRI of their right knee, completed 6/3/25. They have had this knee pain for the past few years, likely a result of overuse from [place] classes and weight lifting. Knee pain is worst with lunging. Lucy has had tried CSI and physical therapy for this issue without much improvement. No past history of right knee injury.     They deny any constant or progressive numbness or tingling in their legs.     REVIEW OF SYSTEMS  Constitutional: See HPI for pain assessment, No significant weight loss, recent trauma  Cardiovascular: No chest pain, shortness of breath  Respiratory: No difficulty breathing, cough  Gastrointestinal: No nausea, vomiting, diarrhea, constipation  Musculoskeletal: Noted in HPI, positive for pain, restricted motion, stiffness  Integumentary: No rashes, easy bruising, redness   Neurological: no numbness or tingling in extremities, no gait disturbances   Psychiatric: No mood changes, memory changes, social issues  Heme/Lymph: no excessive swelling, easy bruising, excessive bleeding  ENT: No hearing changes  Eyes: No vision changes    Medical History[1]     Allergies[2]     Surgical History[3]     Family History[4]     Social History     Socioeconomic History    Marital status: Single     Spouse name: Not on file    Number of children: Not on file    Years of education: Not on file    Highest education level: Not on file   Occupational History    Not on file   Tobacco Use    Smoking status: Never    Smokeless tobacco: Never   Vaping Use    Vaping status: Never Used   Substance and Sexual Activity    Alcohol use: Yes     Comment: rare    Drug use: Not Currently    Sexual activity: Yes     Partners: Male     Birth control/protection: Female Sterilization   Other Topics Concern    Not on file   Social History Narrative    Not on file     Social Drivers of Health     Financial Resource Strain: Not on File (9/5/2023)     "Received from [facility]    Financial Resource Strain     Financial Resource Strain: 0   Recent Concern: Financial Resource Strain - At Risk (2023)    Received from [facility]    Financial Resource Strain     Financial Resource Strain: 2   Food Insecurity: Not on File (2023)    Received from [facility]    Food Insecurity     Food: 0   Transportation Needs: Not on File (2023)    Received from [facility]    Transportation Needs     Transportation: 0   Physical Activity: Not on File (2021)    Received from [facility]    Physical Activity     Physical Activity: 0   Stress: Not on File (2023)    Received from [facility]    Stress     Stress: 0   Social Connections: Not on File (2023)    Received from [facility]    Social Connections     Connectedness: 0   Intimate Partner Violence: Not on file   Housing Stability: Not on File (2023)    Received from [facility]    Housing Stability     Housin        CURRENT MEDICATIONS:   Current Medications[5]     OBJECTIVE    PHYSICAL EXAM      2024     1:24 PM 2024    11:38 AM 2024    11:30 AM 2025     9:05 AM 2025     3:56 PM 2025    11:03 AM 2025     9:15 AM   Vitals   Systolic 118 120 106 106 126 130    Diastolic 60 82 62 70 80 80    BP Location Right arm  Left arm Left arm      Heart Rate  66 67 103 70 67    Height 1.803 m (5' 11\") 1.803 m (5' 11\")   1.803 m (5' 11\") 1.803 m (5' 11\") 1.803 m (5' 11\")   Weight (lb) 222 215   229 230 230   BMI 30.96 kg/m2 29.99 kg/m2   31.94 kg/m2 32.08 kg/m2 32.08 kg/m2   BSA (m2) 2.25 m2 2.21 m2   2.28 m2 2.28 m2 2.28 m2   Visit Report Report Report Report Report Report Report Report      There is no height or weight on file to calculate BMI.    GENERAL: A/Ox3, NAD. Appears healthy, well nourished  PSYCHIATRIC: Mood stable, appropriate memory recall  EYES: EOM intact, no scleral icterus  CARDIOVASCULAR: Palpable peripheral pulses  LUNGS: Breathing non-labored on room air  SKIN: no erythema, rashes, or ecchymoses "     MUSCULOSKELETAL:  Laterality: right Knee Exam  - Skin intact  - No erythema or warmth  - No ecchymosis or soft tissue swelling  - Alignment: neutral  - Palpation: Positive tenderness medial and lateral patellar facets  - ROM: 0 - 0 - 120, patella mobility 1+ medial and lateral with crepitus, no apprehension  - Effusion: Trace  - Strength: knee extension and flexion 5/5, EHL/PF/DF motor intact  - Stability:        Anterior drawer stable       Posterior drawer stable       Varus/valgus stable       negative Lachman  - Equivocal Chelsey's  - Gait: Antalgic  - Hip Exam: flexion to 100+ degrees, full extension, internal/external rotation adequate, and no pain with log roll  - Special Tests: Positive patellar grind test    NEUROVASCULAR:  - Neurovascular Status: sensation intact to light touch distally, lower extremity motor intact  - Capillary refill brisk at extremities, Bilateral dorsalis pedis pulse 2+    Imaging: Multiple views of the affected right knee(s) demonstrate: Moderate chondral loss and chondromalacia of the patellofemoral joint and medial femoral condyle, undersurface nondisplaced tear of the posterior horn of the medial meniscus, ligamentous structures intact.  Images were personally reviewed and interpreted by me.  Radiology reports were reviewed by me as well, if readily available at the time.      Vipin Stanford MD  Attending Surgeon    Sports Medicine Orthopaedic Surgery  [facility] [place]  [facility] [employer]          [1] No past medical history on file.  [2]   Allergies  Allergen Reactions    Cephalexin Rash    Hydrocodone-Acetaminophen Itching    Lamotrigine Rash   [3]   Past Surgical History:  Procedure Laterality Date    OTHER SURGICAL HISTORY  03/06/2019    Foot surgery    OTHER SURGICAL HISTORY  10/11/2022    Cholecystectomy    OTHER SURGICAL HISTORY  10/11/2022    Bunionectomy    TUBAL LIGATION      [4] No family history on file.  [5]   Current Outpatient Medications   Medication Sig Dispense Refill    acetaminophen (Tylenol Extra Strength) 500 mg tablet Take 2 tablets (1,000 mg) by mouth every 6 hours if needed for mild pain (1 - 3). 90 tablet 2    albuterol (Proventil HFA) 90 mcg/actuation inhaler Inhale 2 puffs every 6 hours if needed for wheezing. 18 g 1    cholecalciferol (Vitamin D-3) 125 mcg (5,000 units) capsule Take 1 capsule (125 mcg) by mouth once daily. 90 capsule 3    clonazePAM (KlonoPIN) 0.5 mg tablet Take 1 tablet (0.5 mg) by mouth once daily as needed for anxiety.      clotrimazole-betamethasone (Lotrisone) cream Apply 1 Application topically 2 times a day. 45 g 0    diclofenac sodium (Voltaren) 1 % gel Apply 4.5 inches (4 g) topically 4 times a day. 100 g 1    famotidine (Pepcid) 20 mg tablet Take 1 tablet (20 mg) by mouth once daily at bedtime. 90 tablet 3    hydrOXYzine HCL (Atarax) 50 mg tablet Take 1 tablet (50 mg) by mouth every 4 hours if needed for anxiety. 90 tablet 0    ibuprofen 600 mg tablet Take 1 tablet (600 mg) by mouth every 6 hours if needed for mild pain (1 - 3). 90 tablet 2    lamoTRIgine (LaMICtal) 150 mg tablet       meloxicam (Mobic) 15 mg tablet Take 1 tablet (15 mg) by mouth once daily. 30 tablet 0    naproxen (Naprosyn) 500 mg tablet Take 1 tablet (500 mg) by mouth 2 times a day as needed for mild pain (1 - 3) (pain). 90 tablet 2    nystatin-triamcinolone (Mycolog II) ointment Apply to affected nail folds twice daily 30 g 1    omeprazole (PriLOSEC) 40 mg DR capsule Take 1 capsule (40 mg) by mouth once daily in the morning. Take before meals. 90 capsule 3    polyethylene glycol (Glycolax, Miralax) 17 gram packet Take 17 g by mouth once daily. Mix 1 cap (17g) into 8 ounces of fluid. 90 packet 3     No current facility-administered medications for this visit.

## 2025-06-23 ENCOUNTER — PATIENT MESSAGE (OUTPATIENT)
Dept: ORTHOPEDIC SURGERY | Age: 40
End: 2025-06-23
Payer: COMMERCIAL

## 2025-06-24 NOTE — PATIENT COMMUNICATION
Good morning, I was reading Dr. Stanford's last note, stating that he recommends Hyaluronic injections. She started out as our patient and we sent to you guys. Are you guys doing an auth for the injection or am I?   If I am, I had no idea.

## 2025-06-25 DIAGNOSIS — M25.461 EFFUSION OF RIGHT KNEE: ICD-10-CM

## 2025-06-25 RX ORDER — MELOXICAM 15 MG/1
15 TABLET ORAL DAILY
Qty: 30 TABLET | Refills: 0 | Status: SHIPPED | OUTPATIENT
Start: 2025-06-25 | End: 2025-07-25

## 2025-07-23 ENCOUNTER — PATIENT MESSAGE (OUTPATIENT)
Dept: ORTHOPEDIC SURGERY | Age: 40
End: 2025-07-23
Payer: COMMERCIAL

## 2025-07-23 ENCOUNTER — TELEPHONE (OUTPATIENT)
Dept: ORTHOPEDIC SURGERY | Age: 40
End: 2025-07-23
Payer: COMMERCIAL

## 2025-07-23 NOTE — TELEPHONE ENCOUNTER
PA Started on [facility] Website for Durolane    Case Number [id number]      JASON

## 2025-07-29 ENCOUNTER — APPOINTMENT (OUTPATIENT)
Dept: PRIMARY CARE | Facility: CLINIC | Age: 40
End: 2025-07-29
Payer: COMMERCIAL

## 2025-07-29 VITALS — DIASTOLIC BLOOD PRESSURE: 60 MMHG | BODY MASS INDEX: 31.69 KG/M2 | SYSTOLIC BLOOD PRESSURE: 120 MMHG | WEIGHT: 227.2 LBS

## 2025-07-29 DIAGNOSIS — K21.9 GASTROESOPHAGEAL REFLUX DISEASE WITHOUT ESOPHAGITIS: ICD-10-CM

## 2025-07-29 DIAGNOSIS — M25.561 CHRONIC PAIN OF RIGHT KNEE: Primary | ICD-10-CM

## 2025-07-29 DIAGNOSIS — G89.29 CHRONIC PAIN OF RIGHT KNEE: Primary | ICD-10-CM

## 2025-07-29 PROCEDURE — 99213 OFFICE O/P EST LOW 20 MIN: CPT | Performed by: STUDENT IN AN ORGANIZED HEALTH CARE EDUCATION/TRAINING PROGRAM

## 2025-07-29 PROCEDURE — 1036F TOBACCO NON-USER: CPT | Performed by: STUDENT IN AN ORGANIZED HEALTH CARE EDUCATION/TRAINING PROGRAM

## 2025-07-29 RX ORDER — MELOXICAM 15 MG/1
15 TABLET ORAL DAILY
Qty: 90 TABLET | Refills: 1 | Status: SHIPPED | OUTPATIENT
Start: 2025-07-29 | End: 2026-01-25

## 2025-07-29 RX ORDER — LAMOTRIGINE 200 MG/1
1 TABLET ORAL
COMMUNITY
Start: 2025-07-14

## 2025-07-29 RX ORDER — ALPRAZOLAM 0.25 MG/1
1 TABLET ORAL
COMMUNITY
Start: 2025-06-18

## 2025-07-29 NOTE — PROGRESS NOTES
Lucy Ann is a 40 y.o. year old female presenting for Follow-up       HPI:  She states that her coffee in the morning has been bothering her more from a reflux standpoint.  She is taking her omeprazole and famotidine both in the morning and is wondering if the famotidine dose needs increased or if she needs to take these differently.  Reports no trouble swallowing, unintentional weight loss or bowel changes.    She is requesting a refill of the meloxicam that was prescribed for chronic knee pain.  She states is pretty helpful to continue her usual exercise routine when she takes it and she is wondering if this physician can send a refill at this time.    She is wondering if it is okay to take over-the-counter water pills because she feels like her ankles are less puffy and her abdomen is less puffy when she is on these.  During the time of her blood work in May, she was taking these and her electrolytes were normal.  She has not noticed any side effects with the medications.    ROS:   All pertinent positive symptoms are included in history of present illness.    All other systems have been reviewed and are negative and noncontributory to this patient's current ailments.    Current Medications[1]    OBJECTIVE  Visit Vitals  /60   Wt 103 kg (227 lb 3.2 oz)   BMI 31.69 kg/m²   OB Status Having periods   Smoking Status Never   BSA 2.27 m²        Physical Exam:  GENERAL: Alert, oriented, pleasant, in no acute distress  HEENT: Head normocephalic, atraumatic  EXTREMITIES: no edema, no cyanosis  PSYCH: Appropriate mood and affect  SKIN: No rashes or lesions appreciated    Assessment/Plan   Diagnoses and all orders for this visit:  Chronic pain of right knee  Her pain has improved with the addition of meloxicam.  She can continue this, refill provided.  I did recommend she avoid the use of other NSAIDs such as ibuprofen or naproxen with the meloxicam as this can increase her risk of kidney injury or stomach  ulcer  -     meloxicam (Mobic) 15 mg tablet; Take 1 tablet (15 mg) by mouth once daily.  Gastroesophageal reflux disease without esophagitis  Recommend  her reflux medications, can take the omeprazole in the morning and the famotidine at night.  Consider increasing famotidine to 40 mg at night if she continues to have symptoms.                [1]   Current Outpatient Medications   Medication Sig Dispense Refill    acetaminophen (Tylenol Extra Strength) 500 mg tablet Take 2 tablets (1,000 mg) by mouth every 6 hours if needed for mild pain (1 - 3). 90 tablet 2    albuterol (Proventil HFA) 90 mcg/actuation inhaler Inhale 2 puffs every 6 hours if needed for wheezing. 18 g 1    ALPRAZolam (Xanax) 0.25 mg tablet Take 1 tablet (0.25 mg) by mouth every 12 hours.      cholecalciferol (Vitamin D-3) 125 mcg (5,000 units) capsule Take 1 capsule (125 mcg) by mouth once daily. 90 capsule 3    clonazePAM (KlonoPIN) 0.5 mg tablet Take 1 tablet (0.5 mg) by mouth once daily as needed for anxiety.      clotrimazole-betamethasone (Lotrisone) cream Apply 1 Application topically 2 times a day. 45 g 0    diclofenac sodium (Voltaren) 1 % gel Apply 4.5 inches (4 g) topically 4 times a day. 100 g 1    famotidine (Pepcid) 20 mg tablet Take 1 tablet (20 mg) by mouth once daily at bedtime. 90 tablet 3    hydrOXYzine HCL (Atarax) 50 mg tablet Take 1 tablet (50 mg) by mouth every 4 hours if needed for anxiety. 90 tablet 0    ibuprofen 600 mg tablet Take 1 tablet (600 mg) by mouth every 6 hours if needed for mild pain (1 - 3). 90 tablet 2    lamoTRIgine (LaMICtal) 200 mg tablet Take 1 tablet (200 mg) by mouth early in the morning..      meloxicam (Mobic) 15 mg tablet Take 1 tablet (15 mg) by mouth once daily. 30 tablet 0    naproxen (Naprosyn) 500 mg tablet Take 1 tablet (500 mg) by mouth 2 times a day as needed for mild pain (1 - 3) (pain). 90 tablet 2    nystatin-triamcinolone (Mycolog II) ointment Apply to affected nail folds twice  daily 30 g 1    omeprazole (PriLOSEC) 40 mg DR capsule Take 1 capsule (40 mg) by mouth once daily in the morning. Take before meals. 90 capsule 3    polyethylene glycol (Glycolax, Miralax) 17 gram packet Take 17 g by mouth once daily. Mix 1 cap (17g) into 8 ounces of fluid. 90 packet 3     No current facility-administered medications for this visit.

## 2025-08-20 ENCOUNTER — PATIENT MESSAGE (OUTPATIENT)
Dept: PRIMARY CARE | Facility: CLINIC | Age: 40
End: 2025-08-20
Payer: COMMERCIAL

## 2025-08-20 DIAGNOSIS — K21.9 GASTROESOPHAGEAL REFLUX DISEASE, UNSPECIFIED WHETHER ESOPHAGITIS PRESENT: ICD-10-CM

## 2025-08-20 RX ORDER — OMEPRAZOLE 40 MG/1
40 CAPSULE, DELAYED RELEASE ORAL
Qty: 180 CAPSULE | Refills: 3 | Status: SHIPPED | OUTPATIENT
Start: 2025-08-20 | End: 2026-08-20

## 2025-08-20 RX ORDER — FAMOTIDINE 40 MG/1
40 TABLET, FILM COATED ORAL NIGHTLY
Qty: 90 TABLET | Refills: 3 | Status: SHIPPED | OUTPATIENT
Start: 2025-08-20 | End: 2026-08-20

## 2025-09-03 DIAGNOSIS — R60.0 LOCALIZED EDEMA: ICD-10-CM

## 2025-09-12 ENCOUNTER — APPOINTMENT (OUTPATIENT)
Dept: OBSTETRICS AND GYNECOLOGY | Facility: CLINIC | Age: 40
End: 2025-09-12
Payer: COMMERCIAL